# Patient Record
Sex: FEMALE | Race: WHITE | NOT HISPANIC OR LATINO | Employment: OTHER | URBAN - METROPOLITAN AREA
[De-identification: names, ages, dates, MRNs, and addresses within clinical notes are randomized per-mention and may not be internally consistent; named-entity substitution may affect disease eponyms.]

---

## 2023-08-18 ENCOUNTER — MEDICAL CORRESPONDENCE (OUTPATIENT)
Dept: HEALTH INFORMATION MANAGEMENT | Facility: CLINIC | Age: 78
End: 2023-08-18
Payer: MEDICARE

## 2023-08-28 ENCOUNTER — TRANSCRIBE ORDERS (OUTPATIENT)
Dept: OTHER | Age: 78
End: 2023-08-28

## 2023-08-28 ENCOUNTER — PRE VISIT (OUTPATIENT)
Dept: ONCOLOGY | Facility: CLINIC | Age: 78
End: 2023-08-28
Payer: MEDICARE

## 2023-08-28 DIAGNOSIS — K44.9 HIATAL HERNIA: Primary | ICD-10-CM

## 2023-08-28 NOTE — TELEPHONE ENCOUNTER
Action    Action Taken 8/28/23  WT from NPS, pt advised admission @ [facility] 5/29/23. Pt advised all records @ [facility].   12:32 PM

## 2023-08-30 ENCOUNTER — PATIENT OUTREACH (OUTPATIENT)
Dept: SURGERY | Facility: CLINIC | Age: 78
End: 2023-08-30

## 2023-08-30 DIAGNOSIS — K44.9 HIATAL HERNIA: Primary | ICD-10-CM

## 2023-08-30 NOTE — PROGRESS NOTES
New Patient Thoracic Surgery Nurse Navigator Note     Referring provider: Dr Strong, Gen Surg    Referring Clinic/Organization: [facility] / [facility]      Referred to: Thoracic Surgery    Requested provider (if applicable): First available - did not specify     Referral Received: 08/28/23       Evaluation for : hiatal hernia     Clinical History (per Nurse review of records provided):        * 5/29/2023 EGD ([facility])  Impression:    - Z-line irregular, 28 cm from the                          incisors. Not biopsied 2/2 recent                          bleed                          - tortous esophagus                          - 10 cm hiatal hernia with a few                          small Michael erosions, and old                          bllod clot into hernia- cleared.                          this is most likely source of                          recent bleed. No deep ulcer seen                          - Hematin (altered                          blood/coffee-ground-like material)                          in the gastroesophageal junction,                          in the cardia and in the gastric                          fundus. Cleared                          - Normal examined duodenum.                          - Small inlet patch upper esophagus         * 7/17/2023 GI note ([facility])  Ms. Walter is 77 year old female with PMH Harden's Esophagus and history of recurrent UGIB. 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 She was recently hospitalized from 05/29/2023 -05/30/2023 with GI bleed after having hematemesis and melena and EGD found 10cm hiatal hernia with few small michael erosions. Referring to General Surgery to discuss hiatal hernia.        * 8/10/2023 Gen Surg Dr Strong (NO NOTE IS VISIBLE IN CARE EVERYWHERE) - referral to Thoracic Surgery is visible for paraesophageal hernia.      Clinical Assessment / Barriers to Care (Per Nurse):    Pt with hx of hiatal hernia;  will get pre consult CT chest w/o per protocol for Thoracic Surgery consult & schedule with next available Thoracic Surgeon for consult.          Records Location: [facility] Everywhere     Records Needed:     Outside images from     Additional testing needed prior to consult:     CT chest          Ricardo Deng, RN, BSN, OCN  Oncology New Patient Nurse Navigator   [facility]  [phone]

## 2023-09-08 NOTE — TELEPHONE ENCOUNTER
RECORDS STATUS - ALL OTHER DIAGNOSIS      Action September 8, 2023 11:10 AM    Action Taken Request is faxed to PN for images.      Imaging Received  September 11, 2023 11:36 AM    Action: Images from PN  received and resolved to PACS.     RECORDS RECEIVED FROM:    DATE RECEIVED:    NOTES STATUS DETAILS   OFFICE NOTE from referring provider External 8/28/23: Self Referred   OFFICE NOTE from other specialist -  8/10/23: Dr. Jeremi Strong  7/17/23: Dr. Radha Ivey   DISCHARGE SUMMARY from hospital -  5/29/23: [facility]   OPERATIVE REPORT -  5/29/23: EGD    MEDICATION LIST -     LABS     ANYTHING RELATED TO DIAGNOSIS CE-     IMAGING (NEED IMAGES & REPORT)     CT SCANS PACS/ (img req from PN) 9/12/23: CT Chest    [facility]:  10/21/18: CT Chest Abd Pel  10/21/18: CT Head   XRAYS (img req from PN) 3/2/16: XR Chest   ULTRASOUND (img req from PN) 11/26/16: US Abd

## 2023-09-12 ENCOUNTER — ANCILLARY PROCEDURE (OUTPATIENT)
Dept: CT IMAGING | Facility: CLINIC | Age: 78
End: 2023-09-12
Attending: CLINICAL NURSE SPECIALIST
Payer: MEDICARE

## 2023-09-12 DIAGNOSIS — K44.9 HIATAL HERNIA: ICD-10-CM

## 2023-09-12 PROCEDURE — G1010 CDSM STANSON: HCPCS

## 2023-09-12 PROCEDURE — 71250 CT THORAX DX C-: CPT | Mod: MG

## 2023-09-18 ENCOUNTER — ONCOLOGY VISIT (OUTPATIENT)
Dept: ONCOLOGY | Facility: CLINIC | Age: 78
End: 2023-09-18
Attending: THORACIC SURGERY (CARDIOTHORACIC VASCULAR SURGERY)
Payer: MEDICARE

## 2023-09-18 ENCOUNTER — PREP FOR PROCEDURE (OUTPATIENT)
Dept: SURGERY | Facility: CLINIC | Age: 78
End: 2023-09-18

## 2023-09-18 ENCOUNTER — PRE VISIT (OUTPATIENT)
Dept: ONCOLOGY | Facility: CLINIC | Age: 78
End: 2023-09-18
Payer: MEDICARE

## 2023-09-18 VITALS
OXYGEN SATURATION: 98 % | HEART RATE: 79 BPM | DIASTOLIC BLOOD PRESSURE: 74 MMHG | RESPIRATION RATE: 16 BRPM | WEIGHT: 173.4 LBS | BODY MASS INDEX: 27.87 KG/M2 | SYSTOLIC BLOOD PRESSURE: 126 MMHG | HEIGHT: 66 IN

## 2023-09-18 DIAGNOSIS — K44.9 HIATAL HERNIA WITH GASTROESOPHAGEAL REFLUX: Primary | ICD-10-CM

## 2023-09-18 DIAGNOSIS — K21.9 HIATAL HERNIA WITH GASTROESOPHAGEAL REFLUX: Primary | ICD-10-CM

## 2023-09-18 PROCEDURE — G0463 HOSPITAL OUTPT CLINIC VISIT: HCPCS | Performed by: THORACIC SURGERY (CARDIOTHORACIC VASCULAR SURGERY)

## 2023-09-18 PROCEDURE — 99204 OFFICE O/P NEW MOD 45 MIN: CPT | Performed by: THORACIC SURGERY (CARDIOTHORACIC VASCULAR SURGERY)

## 2023-09-18 RX ORDER — VITAMIN B COMPLEX
1 TABLET ORAL EVERY MORNING
COMMUNITY

## 2023-09-18 RX ORDER — OMEPRAZOLE 40 MG/1
40 CAPSULE, DELAYED RELEASE ORAL 2 TIMES DAILY
COMMUNITY
Start: 2023-07-17

## 2023-09-18 RX ORDER — SUCRALFATE 1 G/1
1 TABLET ORAL 4 TIMES DAILY
COMMUNITY
Start: 2023-07-03

## 2023-09-18 RX ORDER — ESCITALOPRAM OXALATE 10 MG/1
10 TABLET ORAL EVERY MORNING
COMMUNITY
Start: 2023-06-29

## 2023-09-18 RX ORDER — ENOXAPARIN SODIUM 100 MG/ML
40 INJECTION SUBCUTANEOUS
Status: CANCELLED | OUTPATIENT
Start: 2023-09-18

## 2023-09-18 RX ORDER — SODIUM PHOSPHATE,MONO-DIBASIC 19G-7G/118
1 ENEMA (ML) RECTAL EVERY MORNING
COMMUNITY

## 2023-09-18 RX ORDER — ASCORBIC ACID 500 MG
1 TABLET ORAL EVERY MORNING
COMMUNITY
Start: 2023-07-17

## 2023-09-18 ASSESSMENT — PAIN SCALES - GENERAL: PAINLEVEL: NO PAIN (0)

## 2023-09-18 NOTE — LETTER
"    9/18/2023         RE: Talya Walter  [address] [address]        Dear Colleague,    Thank you for referring your patient, Talya Walter, to the [facility]. Please see a copy of my visit note below.    THORACIC SURGERY - NEW PATIENT OFFICE VISIT      Dear Dr. Goldberg,    I saw Talya Walter at Dr. Tan s request in consultation for the evaluation and treatment of a hiatal hernia.     HPI  Talya Walter is a 77 year old female with a known hiatal hernia and Harden's esophagus who recently had an upper GI bleed. Endoscopy showed Michael's ulcers and old clot in the stomach. Of note, she has von WIllebrand's disease. She has no dysphagia now - she avoids tomato skins which used to get stuck. She clears her throat a lot.  Her weight is stable. She has daily bowel movements.    She is an avid tobias and enjoys cooking. Walking is limited by low back pain/compression fractures of the vertebral bodies. She can walk at least a couple of blocks. She is up and down steps at her house daily.    Previsit Tests   CT chest 9/12/2023:  Large hiatal hernia, stable to 2018    EGD 5/29/2023: 10 cm hiatal hernia with small Michael erosions and old blood clot. 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  1 pack per day. Quit 38 years ago.     Physical examination  /74   Pulse 79   Resp 16   Ht 1.676 m (5' 6\")   Wt 78.7 kg (173 lb 6.4 oz)   SpO2 98%   " "BMI 27.99 kg/m     Physical Exam  Constitutional:       Appearance: Normal appearance.   Eyes:      Conjunctiva/sclera: Conjunctivae normal.   Cardiovascular:      Rate and Rhythm: Normal rate.   Pulmonary:      Effort: Pulmonary effort is normal.   Abdominal:      Palpations: Abdomen is soft.      Comments: Midline scar   Neurological:      Mental Status: She is oriented to person, place, and time.   Psychiatric:         Mood and Affect: Mood normal.         Behavior: Behavior normal.         Thought Content: Thought content normal.         Judgment: Judgment normal.            From a personal perspective, she lives with her partner. She has one child and five grandchildren and 1 greatgrandchild. 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    IMPRESSION (K44.9,  K21.9) Hiatal hernia with gastroesophageal reflux  (primary encounter diagnosis)     This person is a 77 year old female with a symptomatic hiatal hernia and reflux. She is a reasonable surgical candidate.      PLAN  I spent 40 min on the date of the encounter in chart review, patient visit, review of tests, documentation and/or discussion with other providers about the issues documented above. I reviewed the plan as follows:    Procedure planned: Robot-assisted laparoscopic hiatal hernia repair, fundoplication, possible gastroplasty, gastrostomy.    The risks include, but are not limited to the following.  There is a risk of injury to the stomach, esophagus or abdominal contents.  There is a risk of injury to the vagus nerves, which could result in functional emptying issues for the stomach.  There is a risk of problems with the wrap, which can include difficulty swallowing, persistent reflux and pain.  Coughing or retching/vomiting in the weeks after surgery can result in breakdown of the repair.  If the esophagus does not reach the abdominal cavity, a \"new\" esophagus will be made out of the top of the stomach.  This gastroplasty " "can dilate over time, causing new swallowing difficulties, and continues to produce acid.  The wrap may loosen over time and need to be revised with a new operation.  The lung cavities may be entered and tubes placed to evacuate air and fluid.   Finally, there is a risk of death.       Necessary Preop Tests & Appointments: Preoperative assessment clinic; Hematology clinic for perioperative plan (Scheduled this week)    Regional Anesthesia Plan: None    Anticoagulation Plan: Prophylactic Lovenox      I appreciate the opportunity to participate in the care of your patient and will keep you updated.      Sincerely,    Jarad Cheung MD      Oncology Rooming Note    September 18, 2023 1:18 PM   Talya aWlter is a 77 year old female who presents for:    Chief Complaint   Patient presents with     Oncology Clinic Visit     New Patient     Initial Vitals: /74   Pulse 79   Resp 16   Ht 1.676 m (5' 6\")   Wt 78.7 kg (173 lb 6.4 oz)   SpO2 98%   BMI 27.99 kg/m   Estimated body mass index is 27.99 kg/m  as calculated from the following:    Height as of this encounter: 1.676 m (5' 6\").    Weight as of this encounter: 78.7 kg (173 lb 6.4 oz). Body surface area is 1.91 meters squared.  No Pain (0) Comment: Data Unavailable   No LMP recorded.  Allergies reviewed: Yes  Medications reviewed: Yes    Medications: Medication refills not needed today.  Pharmacy name entered into [employer]: [id number] - [facility] [address]    Clinical concerns: None       Meryl Jara MA                Again, thank you for allowing me to participate in the care of your patient.        Sincerely,        Jarad Cheung MD  "

## 2023-09-18 NOTE — PROGRESS NOTES
"THORACIC SURGERY - NEW PATIENT OFFICE VISIT      Dear Dr. Goldberg,    I saw Talya Walter at Dr. Tan s request in consultation for the evaluation and treatment of a hiatal hernia.     HPI  Talya Walter is a 77 year old female with a known hiatal hernia and Harden's esophagus who recently had an upper GI bleed. Endoscopy showed Michael's ulcers and old clot in the stomach. Of note, she has von WIllebrand's disease. She has no dysphagia now - she avoids tomato skins which used to get stuck. She clears her throat a lot.  Her weight is stable. She has daily bowel movements.    She is an avid tobias and enjoys cooking. Walking is limited by low back pain/compression fractures of the vertebral bodies. She can walk at least a couple of blocks. She is up and down steps at her house daily.    Previsit Tests   CT chest 9/12/2023:  Large hiatal hernia, stable to 2018    EGD 5/29/2023: 10 cm hiatal hernia with small Michael erosions and old blood clot. 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  1 pack per day. Quit 38 years ago.     Physical examination  /74   Pulse 79   Resp 16   Ht 1.676 m (5' 6\")   Wt 78.7 kg (173 lb 6.4 oz)   SpO2 98%   BMI 27.99 kg/m     Physical Exam  Constitutional:       Appearance: Normal appearance.   Eyes:      Conjunctiva/sclera: Conjunctivae normal.   Cardiovascular:      Rate and Rhythm: Normal rate.   Pulmonary:      Effort: Pulmonary effort is normal. " "  Abdominal:      Palpations: Abdomen is soft.      Comments: Midline scar   Neurological:      Mental Status: She is oriented to person, place, and time.   Psychiatric:         Mood and Affect: Mood normal.         Behavior: Behavior normal.         Thought Content: Thought content normal.         Judgment: Judgment normal.            From a personal perspective, she lives with her partner. She has one child and five grandchildren and 1 greatgrandchild. 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    IMPRESSION (K44.9,  K21.9) Hiatal hernia with gastroesophageal reflux  (primary encounter diagnosis)     This person is a 77 year old female with a symptomatic hiatal hernia and reflux. She is a reasonable surgical candidate.      PLAN  I spent 40 min on the date of the encounter in chart review, patient visit, review of tests, documentation and/or discussion with other providers about the issues documented above. I reviewed the plan as follows:    Procedure planned: Robot-assisted laparoscopic hiatal hernia repair, fundoplication, possible gastroplasty, gastrostomy.    The risks include, but are not limited to the following.  There is a risk of injury to the stomach, esophagus or abdominal contents.  There is a risk of injury to the vagus nerves, which could result in functional emptying issues for the stomach.  There is a risk of problems with the wrap, which can include difficulty swallowing, persistent reflux and pain.  Coughing or retching/vomiting in the weeks after surgery can result in breakdown of the repair.  If the esophagus does not reach the abdominal cavity, a \"new\" esophagus will be made out of the top of the stomach.  This gastroplasty can dilate over time, causing new swallowing difficulties, and continues to produce acid.  The wrap may loosen over time and need to be revised with a new operation.  The lung cavities may be entered and tubes placed to evacuate air and fluid.   " Finally, there is a risk of death.       Necessary Preop Tests & Appointments: Preoperative assessment clinic; Hematology clinic for perioperative plan (Scheduled this week)    Regional Anesthesia Plan: None    Anticoagulation Plan: Prophylactic Lovenox      I appreciate the opportunity to participate in the care of your patient and will keep you updated.      Sincerely,    Jarad Cheung MD

## 2023-09-18 NOTE — PROGRESS NOTES
"Oncology Rooming Note    September 18, 2023 1:18 PM   Talya Walter is a 77 year old female who presents for:    Chief Complaint   Patient presents with    Oncology Clinic Visit     New Patient     Initial Vitals: /74   Pulse 79   Resp 16   Ht 1.676 m (5' 6\")   Wt 78.7 kg (173 lb 6.4 oz)   SpO2 98%   BMI 27.99 kg/m   Estimated body mass index is 27.99 kg/m  as calculated from the following:    Height as of this encounter: 1.676 m (5' 6\").    Weight as of this encounter: 78.7 kg (173 lb 6.4 oz). Body surface area is 1.91 meters squared.  No Pain (0) Comment: Data Unavailable   No LMP recorded.  Allergies reviewed: Yes  Medications reviewed: Yes    Medications: Medication refills not needed today.  Pharmacy name entered into [employer]: [id number] - [place], [place] - [address]    Clinical concerns: None       Meryl Jara MA              "

## 2023-09-21 ENCOUNTER — VIRTUAL VISIT (OUTPATIENT)
Dept: HEMATOLOGY | Facility: CLINIC | Age: 78
End: 2023-09-21
Attending: INTERNAL MEDICINE
Payer: MEDICARE

## 2023-09-21 DIAGNOSIS — D68.00 VON WILLEBRAND'S DISEASE (H): Primary | ICD-10-CM

## 2023-09-21 PROCEDURE — 99205 OFFICE O/P NEW HI 60 MIN: CPT | Mod: VID | Performed by: INTERNAL MEDICINE

## 2023-09-21 NOTE — PROGRESS NOTES
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  In order to make this safe, we will plan for VWF concentrate therapy prior to the procedure and a goal to keep VWF activity > 80% for about a week after the procedure    Recommendations:  I counseled the patient about my recommendations to receive VWF concentrate before the procedure and possibly 1-2 more doses after the procedure to prevent surgical bleeding  We will check with her insurance regarding coverage of VonVendi (rVWF) 50 U/kg to be given the day prior to the procedure  Repeat her VWF diagnostic panel to differentiate 2A from 2M (doesn't change surgical management plan though), with her next lab appointment next week.  Check VWF Ag/Activity starting 60 minutes after the procedure and daily thereafter.  Likely will have to repeat dosing once or twice after the procedure  She will contact us when she has a surgical date and we will work on insurance approval    60 minutes spent by me on the date of the encounter doing chart review, review of outside records, review of test results, interpretation of tests, patient visit, and documentation     Cesar Prasad MD   of Medicine, Division of Hematology, Oncology and Transplantation  [employer] [facility] [employer]     Lab addendum:  VWD multimer analysis (home lab did not do entire panel) consistent  "with type 2A VWD.       Latest Reference Range & Units 10/15/23 11:26   Factor 8 Assay 55 - 200 % 60   von Willebrand Factor Multimers  von Willebrand factor multimeric analysis shows absence of the   high-molecular-weight multimers.    von Willebrand Factor Activity 50 - 180 % 20 (L)   von Willebrand Factor Antigen 50 - 200 % 66   (L): Data is abnormally low    Cesar Prasad MD   of Medicine, Division of Hematology, Oncology and Transplantation  [employer] [facility] [employer]     -------------------------------  History: Talya Walter is a 77 year old woman with nearly lifelong diagnosis of severe von Willebrand disease with bleeding since childhood.  She reports that concern was raised for a bleeding disorder when she started losing her \"baby teeth\".  She has significant bleeding with loss of these deciduous teeth which eventually led to an evaluation for von Willebrand disease where she was living at the time, in [facility] [place]  She was eventually diagnosed with von Willebrand disease, but its unclear what subtype.    She reports that she did not have epistaxis or other significant bleeding symptoms in childhood.  Her recollection of the next bleeding event was with menarche, around age 13.  She reports severe prolonged menstrual periods and eventually was put on oral contraceptives to control this at a very young age.  She also recalls that by age 18 when she was attending [employer], she passed out from severe iron deficiency anemia related to heavy menstrual periods even despite use of oral contraceptives.    The next major of bleeding events occurred with childbirth.  Her recollection was that she had major bleeding with the birth of her children.  This occurred in the late 60s and early 70s where there is limited access to factor concentrate.  She then recalls that at age 32 (1977) it was recommended that she have a hysterectomy.  However, given fears about " the risk of bleeding, instead she received radiation to her ovaries which very rapidly induced a menopausal state.    The next major event in her life came in 2004, when she had a major abdominal exploratory surgery for ongoing problems with GI bleeding and iron deficiency anemia.  This was also complicated by bleeding and required transfusion.    Then around 2010, per her recollection, she started working with a local hematologist who recommended weekly prophylaxis with humate to try to get her GI bleeding to stop.  She had done well with this treatment but still did intermittently require IV iron.  Discontinued for at least 3 years.  More recently, around 2020, when she was hospitalized for an acute bleed which was found to be related to a large hiatal hernia and bleeding Michael ulcers.  It is recommended she increase her PPI to twice a day and eat smaller meals.  She was given blood transfusion and Humate-P.     She presents today to establish care so that she can safely move ahead with surgical repair of her hiatal hernia.  Unfortunately, in May 2023 despite going several years without bleeding and without humate for prophylaxis, she developed massive GI bleed and was hospitalized.  Hence, now the plan is to move ahead with surgical repair of the hiatal hernia to prevent other GI bleeding events.    Today, she reports she is feeling well with no complaints.  Her recollection is that she has type IIb von Willebrand disease but she is not exactly sure what the diagnosis is.  She has not scheduled a surgery yet but is planning to do so soon and would like to do so before the end of the year.    Medical history is notable for von Willebrand disease (unknown subtype but likely type IIa or type II), hiatal hernia with frequent GI bleeding, history of severe and recurrent iron deficiency anemia from GI bleeding.      Current Outpatient Medications   Medication    escitalopram (LEXAPRO) 10 MG tablet    Ferrous Sulfate  (IRON) 28 MG TABS    glucosamine-chondroitin 500-400 MG CAPS per capsule    omeprazole (PRILOSEC) 40 MG DR capsule    sucralfate (CARAFATE) 1 GM tablet    UNABLE TO FIND    vitamin C (ASCORBIC ACID) 500 MG tablet    Vitamin D3 (CHOLECALCIFEROL) 25 mcg (1000 units) tablet     No current facility-administered medications for this visit.       Physical Exam:  By phone she is in good spirits and sounds well.  Her thinking is linear and and converses normally    Labs:  I reviewed her labs in care everywhere since she gets her care through another healthcare system.  She does have several very old von Willebrand panels that show ristocetin cofactor activity less than 10% suggestive of severe von Willebrand factor activity deficiency.  She has mildly low von Willebrand factor antigen in the 50s to 70s which is consistent with a type II von Willebrand disease.  She does not have thrombocytopenia which argues against type IIb von Willebrand disease, but would be suggestive of type IIa or type IIM.    Imaging:  None

## 2023-09-21 NOTE — PROGRESS NOTES
Patient was contacted to complete the pre-visit call prior to their video visit with the provider.      Allergies and medications were reviewed.    I thanked them for their time to cover this information     Kaiden Armstrong CMA

## 2023-09-27 ENCOUNTER — TRANSFERRED RECORDS (OUTPATIENT)
Dept: HEALTH INFORMATION MANAGEMENT | Facility: CLINIC | Age: 78
End: 2023-09-27
Payer: MEDICARE

## 2023-10-02 ENCOUNTER — PATIENT OUTREACH (OUTPATIENT)
Dept: ONCOLOGY | Facility: CLINIC | Age: 78
End: 2023-10-02
Payer: MEDICARE

## 2023-10-02 ENCOUNTER — TELEPHONE (OUTPATIENT)
Dept: ONCOLOGY | Facility: CLINIC | Age: 78
End: 2023-10-02
Payer: MEDICARE

## 2023-10-02 NOTE — TELEPHONE ENCOUNTER
Spoke with patient to schedule procedure with Dr. Cheung   Procedure was scheduled on 10/18 at [facility] OR  Patient will have H&P with PAC video    Patient is aware a COVID-19 test is needed before their procedure ONLY IF symptomatic.   (Patient is aware Thoracic is no longer requiring COVID-19 test)       Patient is aware a / is needed day of surgery.   Surgery Letter was sent via [employer],     Pt states she has to have an infusion the day before surgery. She is wanting to know if she can be admitted the day before surgery to start her infusion since it has to be done in the late afternoon, and then she has to be back for surgery at 5:30 am. Writer informed pt a  msg would be sent to the nurse to give her a call to discuss.    Patient has my direct contact information for any further questions.

## 2023-10-02 NOTE — PROGRESS NOTES
[facility]: Cancer Care Initial Note                                    Discussion with Patient:                                                      Intro to RNCC role     Education concerns: Pre and post op surgery        Assessment:                                                      Initial  Current living arrangement:: I live in a private home (significant other)  Informal Support system:: Children;Significant other  Bed or wheelchair confined:: No  Mobility Status: Independent  Transportation means:: Family  Medication adherence problem (GOAL):: No  Knowledgeable about how to use meds:: Yes  Medication side effects suspected:: No  Advanced Care Plans/Directives on file:: No  Patient Reported Pain?: No    Plan of Care Education Review:   Assessment completed with:: Patient    Current living arrangement  I live in a private home (significant other)    Plan of Care Education   Yearly learning assessment completed?: Yes (see Education tab)  Diagnosis:: hiatal hernia  Does patient understand diagnosis?: Yes  Tx plan/regimen:: Robot-assisted hiatal hernia repair, Nissen fundoplication, possible gastroplasty, gastrostomy  Does patient understand treatment plan/regimen?: Yes  Preparing for treatment:: Reviewed treatment preparation information with patient (vascular access, day of chemo, visitor policy, what to bring, etc.)  Supportive services:: [facility] [place]  Transportation means:: Family  Safety/self care at home reviewed with patient:: Yes  Informal Support system:: Children;Significant other  Coping - concerns/fears reviewed with patient:: Yes  Plan of Care:: MD follow-up appointment  When to call provider:: Bleeding;Uncontrolled nausea/vomiting;Increased shortness of breath;New/worsening pain;Shaking chills;Temperature >100.4F;Uncontrolled diarrhea/constipation  Reasons for deferring treatment reviewed with patient:: Yes    Evaluation of Learning  Patient Education Provided: Yes  Readiness::  Acceptance  Method:: Explanation  Response:: Demonstrates understanding           Intervention/Education provided during outreach:                                                       Information about surgery  Worried about going to the [facility] in 24 hours for the Vonvendi infusion (needed 6 hours prior surgery; scheduled for 10/18/23)      Follow up call in 1-2 weeks  Patient to follow up as scheduled at next appt  Patient to call/Rentelligencet message with updates  Confirmed patient has clinic and triage numbers    Arabella Stuart, RN, BSN  Specialty Care Coordinator  [facility]  [phone]

## 2023-10-02 NOTE — TELEPHONE ENCOUNTER
FUTURE VISIT INFORMATION      SURGERY INFORMATION:  Date: 10/18/23  Location: uu or  Surgeon:  Jarad Cheung MD   Anesthesia Type:  general  Procedure: Robot-assisted hiatal hernia repair, Nissen fundoplication, possible gastroplasty, gastrostomy   Consult: ov 23    RECORDS REQUESTED FROM:       Primary Care Provider: Bear Goldberg [facility]    Most recent EKG+ Tracin23- [facility]

## 2023-10-03 ENCOUNTER — TRANSFERRED RECORDS (OUTPATIENT)
Dept: HEALTH INFORMATION MANAGEMENT | Facility: CLINIC | Age: 78
End: 2023-10-03
Payer: MEDICARE

## 2023-10-03 ENCOUNTER — PATIENT OUTREACH (OUTPATIENT)
Dept: ONCOLOGY | Facility: CLINIC | Age: 78
End: 2023-10-03
Payer: MEDICARE

## 2023-10-03 DIAGNOSIS — D68.00 VON WILLEBRAND'S DISEASE (H): Primary | ICD-10-CM

## 2023-10-03 RX ORDER — MEPERIDINE HYDROCHLORIDE 25 MG/ML
25 INJECTION INTRAMUSCULAR; INTRAVENOUS; SUBCUTANEOUS EVERY 30 MIN PRN
Status: CANCELLED | OUTPATIENT
Start: 2023-10-17

## 2023-10-03 RX ORDER — EPINEPHRINE 1 MG/ML
0.3 INJECTION, SOLUTION, CONCENTRATE INTRAVENOUS EVERY 5 MIN PRN
Status: CANCELLED | OUTPATIENT
Start: 2023-10-17

## 2023-10-03 RX ORDER — DIPHENHYDRAMINE HYDROCHLORIDE 50 MG/ML
50 INJECTION INTRAMUSCULAR; INTRAVENOUS
Status: CANCELLED
Start: 2023-10-17

## 2023-10-03 RX ORDER — HEPARIN SODIUM (PORCINE) LOCK FLUSH IV SOLN 100 UNIT/ML 100 UNIT/ML
5 SOLUTION INTRAVENOUS
Status: CANCELLED | OUTPATIENT
Start: 2023-10-17

## 2023-10-03 RX ORDER — HEPARIN SODIUM,PORCINE 10 UNIT/ML
5-20 VIAL (ML) INTRAVENOUS DAILY PRN
Status: CANCELLED | OUTPATIENT
Start: 2023-10-17

## 2023-10-03 RX ORDER — ALBUTEROL SULFATE 90 UG/1
1-2 AEROSOL, METERED RESPIRATORY (INHALATION)
Status: CANCELLED
Start: 2023-10-17

## 2023-10-03 RX ORDER — ALBUTEROL SULFATE 0.83 MG/ML
2.5 SOLUTION RESPIRATORY (INHALATION)
Status: CANCELLED | OUTPATIENT
Start: 2023-10-17

## 2023-10-03 NOTE — TELEPHONE ENCOUNTER
[facility]: Cancer Care                                                                                          LVM with Dr. Prasad's team regarding the order for medication Vonvendi which will be needed prior to her surgery scheduled for 10/18  In order to give it at [place] [facility], we will need it in a therapy plan.   Awaiting a call back       Arabella Stuart, RN, BSN  Specialty Care Coordinator  [facility] [place] [facility]  [phone]

## 2023-10-04 ENCOUNTER — ANESTHESIA EVENT (OUTPATIENT)
Dept: SURGERY | Facility: CLINIC | Age: 78
DRG: 327 | End: 2023-10-04
Payer: MEDICARE

## 2023-10-04 ENCOUNTER — TELEPHONE (OUTPATIENT)
Dept: SURGERY | Facility: CLINIC | Age: 78
End: 2023-10-04

## 2023-10-04 ENCOUNTER — PRE VISIT (OUTPATIENT)
Dept: SURGERY | Facility: CLINIC | Age: 78
End: 2023-10-04

## 2023-10-04 ENCOUNTER — VIRTUAL VISIT (OUTPATIENT)
Dept: SURGERY | Facility: CLINIC | Age: 78
End: 2023-10-04
Payer: MEDICARE

## 2023-10-04 DIAGNOSIS — Z01.818 PRE-OP EVALUATION: Primary | ICD-10-CM

## 2023-10-04 PROCEDURE — 99203 OFFICE O/P NEW LOW 30 MIN: CPT | Mod: 95 | Performed by: PHYSICIAN ASSISTANT

## 2023-10-04 ASSESSMENT — ENCOUNTER SYMPTOMS: SEIZURES: 0

## 2023-10-04 ASSESSMENT — LIFESTYLE VARIABLES: TOBACCO_USE: 1

## 2023-10-04 ASSESSMENT — COPD QUESTIONNAIRES: COPD: 0

## 2023-10-04 NOTE — TELEPHONE ENCOUNTER
[facility] Call [facility]    Phone Message    May a detailed message be left on voicemail: yes     Reason for Call: Other: Pt has questions regarding  von willebrand factor recomb (VONVENDI) injection 4,000 Units4,000 Units prior to surgery, please review and call pt      Action Taken: Message routed to:  [facility] ([facility]): pac    Travel Screening: Not Applicable

## 2023-10-04 NOTE — PATIENT INSTRUCTIONS
Preparing for Your Surgery      Name:  Talya Walter   MRN:  [id number]   :  1945   Today's Date:  10/4/2023       Arriving for surgery:  Surgery date:  10/18/2023  Arrival time:  5:30 am    Please come to:     Please come to:      [facility] [address]  [address] [place]  [address]      The [facility] Patient /Visitor Ramp is located at [address]. Patients and visitors who self-park will receive the reduced hospital parking rate. If the Patient /Visitor Ramp is full, please follow the signs to the  parking located at the main hospital entrance.     parking is available ( 24 hours/ 7 days a week)    Discounted parking pass options are available for patients and visitors. They can be purchased at the gamigo desk at the main hospital entrance.    -    Stop at the security desk and they will direct surgery patients to the 3rd floor Surgery Waiting Room. [phone]     -  If you are in need of directions, wheelchair or escort please stop at the Information/security desk in the lobby.       What can I eat or drink?  -  You may eat and drink normally up to 8 hours prior to arrival time. (Until 10/17/23 at 11 pm)  -  You may have clear liquids until 2 hours prior to arrival time. (Until 3:30 am)    Examples of clear liquids:  Water  Clear broth  Juices (apple, white grape, white cranberry  and cider) without pulp  Noncarbonated, powder based beverages  (lemonade and Geremias-Aid)  Sodas (Sprite, 7-Up, ginger ale and seltzer)  Coffee or tea (without milk or cream)  Gatorade    -  No Alcohol or cannabis products for at least 24 hours before surgery.     Which medicines can I take?    Hold Aspirin for 7 days before surgery.   Hold Multivitamins for 7 days before surgery.  Hold Supplements for 7 days before surgery. (Glucosamine)  Hold Ibuprofen (Advil, Motrin) for 1 day(s) before surgery--unless otherwise  directed by surgeon.  Hold Naproxen (Aleve) for 4 days before surgery.    -  DO NOT take these medications the day of surgery:  Ferrous Sulfate  Sucralfate (Carafate)  Vitamin C  Vitamin D      -  PLEASE TAKE these medications the day of surgery:  Escitalopram  Omeprazole       How do I prepare myself?  - Please take 2 showers (one the night prior to surgery and one the morning of surgery) using Scrubcare or Hibiclens soap.    Use this soap only from the neck to your toes.     Leave the soap on your skin for one minute--then rinse thoroughly.      You may use your own shampoo and conditioner. No other hair products.   - Please remove all jewelry and body piercings.  - No lotions, deodorants or fragrance.  - No makeup or fingernail polish.   - Bring your ID and insurance card.    -If you have a Deep Brain Stimulator, Spinal Cord Stimulator, or any Neuro Stimulator device---you must bring the remote control to the hospital.      Covid testing policy as of 12/06/2022  Your surgeon will notify and schedule you for a COVID test if one is needed before surgery--please direct any questions or COVID symptoms to your surgeon      Questions or Concerns:    - For any questions regarding the day of surgery or your hospital stay, please contact the Pre Admission Nursing Office at [phone].       - If you have health changes between today and your surgery, please call your surgeon.       - For questions after surgery, please call your surgeons office.           Current Visitor Guidelines    You may have 2 visitors in the pre op area.    Visiting hours: 8 a.m. to 8:30 p.m.    You may have four visitors during your inpatient hospital stay.    Patients confirmed or suspected to have symptoms of COVID 19 or flu:     No visitors allowed for adult patients.   Children (under age 18) can have 1 named visitor.     People who are sick or showing symptoms of COVID 19 or flu:    Are not allowed to visit patients--we can only make  exceptions in special situations.       Please follow these guidelines for your visit:          Please maintain social distance          Masking is optional--however at times you may be asked to wear a mask for the safety of yourself and others     Clean your hands with alcohol hand . Do this when you arrive at and leave the building and patient room,    And again after you touch your mask or anything in the room.     Go directly to and from the room you are visiting.     Stay in the patient s room during your visit. Limit going to other places in the hospital as much as possible     Leave bags and jackets at home or in the car.     For everyone s health, please don t come and go during your visit. That includes for smoking   during your visit.

## 2023-10-04 NOTE — TELEPHONE ENCOUNTER
Spoke with Talya regarding Vonvendi injection.  Clarified the order for her - she has 1 dose scheduled for the afternoon before surgery.  There is no other dose overdue.  Talya expressed understanding.  Irene Gibbons RN

## 2023-10-04 NOTE — PROGRESS NOTES
Talya is a 78 year old who is being evaluated via a billable video visit.      How would you like to obtain your AVS? Anastasia Florence   Talya is a 78 year old, presenting for the following health issues:  Pre-Op Exam    HPI           Review of Systems       Physical Exam   BRANDON Dobson LPN

## 2023-10-04 NOTE — H&P
Pre-Operative H & P     CC:  Preoperative exam to assess for increased cardiopulmonary risk while undergoing surgery and anesthesia.    Date of Encounter: 10/4/2023  Primary Care Physician:  Bear Goldberg     Reason for visit:   Encounter Diagnosis   Name Primary?    Pre-op evaluation Yes       HPI  Talya Walter is a 78 year old female who presents for pre-operative H & P in preparation for  Procedure Information       Case: [id number] Date/Time: 10/18/23 0730    Procedure: Robot-assisted hiatal hernia repair, Nissen fundoplication, possible gastroplasty, gastrostomy (Chest)    Anesthesia type: General    Diagnosis: Hiatal hernia with gastroesophageal reflux [K44.9, K21.9]    Pre-op diagnosis: Hiatal hernia with gastroesophageal reflux [K44.9, K21.9]    Location:  OR [address] [place] [address] OR    Providers: Jarad Cheung MD            Patient is being evaluated for comorbid conditions of von willebrand's disease, anemia    Ms. Walter has a known hiatal hernia and Harden's esophagus. She recently had a upper GI bleed. She was seen by Dr. Cheung and is now scheduled for the above procedure. Of note, she has Von Willebrand's disease and was seen by hematology for perioperative plan.     History is obtained from the patient and chart review    Hx of abnormal bleeding or anti-platelet use: Von Willebrand's disease    Menstrual history: No LMP recorded. Patient is perimenopausal.     Past Medical History  No past medical history on file.    Past Surgical History  Past Surgical History:   Procedure Laterality Date    LAPAROTOMY EXPLORATORY         Prior to Admission Medications  Current Outpatient Medications   Medication Sig Dispense Refill    escitalopram (LEXAPRO) 10 MG tablet Take 10 mg by mouth every morning      Ferrous Sulfate (IRON) 28 MG TABS Take 1 tablet by mouth every morning      glucosamine-chondroitin 500-400 MG CAPS per capsule Take 1 capsule by mouth every morning      omeprazole (PRILOSEC) 40 MG DR capsule  Take 40 mg by mouth 2 times daily      sucralfate (CARAFATE) 1 GM tablet Take 1 g by mouth 4 times daily      UNABLE TO FIND Take 1 capsule by mouth every morning MEDICATION NAME: Stool softener      vitamin C (ASCORBIC ACID) 500 MG tablet Take 1 tablet by mouth every morning      Vitamin D3 (CHOLECALCIFEROL) 25 mcg (1000 units) tablet Take 1 tablet by mouth every morning         Allergies  Allergies   Allergen Reactions    Desmopressin Anaphylaxis     PN: THROAT CLOSED    Aspirin      PN: POSSIBLE BLEED    Bee Venom Swelling    Meloxicam      Cause bleeding    Nsaids      PN: POSSIBLE BLEED       Social History  Social History     Socioeconomic History    Marital status: Patient Declined     Spouse name: Not on file    Number of children: Not on file    Years of education: Not on file    Highest education level: Not on file   Occupational History    Not on file   Tobacco Use    Smoking status: Former     Types: Cigarettes     Quit date:      Years since quittin.7     Passive exposure: Past    Smokeless tobacco: Never   Substance and Sexual Activity    Alcohol use: Yes     Comment: 1-2 glasses of wine daily    Drug use: Never    Sexual activity: Not on file   Other Topics Concern    Not on file   Social History Narrative    Not on file     Social Determinants of Health     Financial Resource Strain: Not on file   Food Insecurity: Not on file   Transportation Needs: Not on file   Physical Activity: Not on file   Stress: Not on file   Social Connections: Not on file   Interpersonal Safety: Not on file   Housing Stability: Not on file       Family History  Family History   Problem Relation Age of Onset    Anesthesia Reaction No family hx of        Review of Systems  The complete review of systems is negative other than noted in the HPI or here.   Anesthesia Evaluation   Pt has had prior anesthetic.     No history of anesthetic complications       ROS/MED HX  ENT/Pulmonary:     (+)                tobacco use,  Past use,                   (-) asthma and COPD   Neurologic:  - neg neurologic ROS  (-) no seizures and no CVA   Cardiovascular:     (+)  - -   -  - -                                 Previous cardiac testing   Echo: Date: Results:    Stress Test:  Date: Results:    ECG Reviewed:  Date: 5/2023 Results:  NSR  Cath:  Date: Results:   (-) taking anticoagulants/antiplatelets   METS/Exercise Tolerance: >4 METS Comment: , up and down stairs often    Hematologic: Comments: Von Willebrand's disease     (+)       history of blood transfusion, no previous transfusion reaction,     (-) history of blood clots   Musculoskeletal:  - neg musculoskeletal ROS     GI/Hepatic:     (+) GERD,     hiatal hernia,              Renal/Genitourinary:  - neg Renal ROS     Endo:  - neg endo ROS  (-) chronic steroid usage   Psychiatric/Substance Use:       Infectious Disease:  - neg infectious disease ROS     Malignancy:  - neg malignancy ROS     Other:            Virtual visit -  No vitals were obtained    Physical Exam  Constitutional: Awake, alert, cooperative, no apparent distress, and appears stated age.  HENT: Normocephalic  Respiratory: non labored breathing   Neurologic: Awake, alert, oriented to name, place and time.   Neuropsychiatric: Calm, cooperative. Normal affect.      Prior Labs/Diagnostic Studies   All labs and imaging personally reviewed     EKG/ stress test - if available please see in ROS above   No results found.       No data to display                  The patient's records and results personally reviewed by this provider.     Outside records reviewed from: Care Everywhere      Assessment    Talya Walter is a 78 year old female seen as a PAC referral for risk assessment and optimization for anesthesia.    Plan/Recommendations  Pt will be optimized for the proposed procedure.  See below for details on the assessment, risk, and preoperative recommendations    NEUROLOGY  - No history of TIA, CVA or seizure  -Post  "Op delirium risk factors:  No risk identified    ENT  - No current airway concerns.  Will need to be reassessed day of surgery.  Mallampati: Unable to assess  TM: Unable to assess    CARDIAC  - No history of CAD, Hypertension, and Afib  -denies cardiac history or symptoms   - METS (Metabolic Equivalents)  Patient performs 4 or more METS exercise without symptoms            Total Score: 0      RCRI-Low risk: Class 2 0.9% complication rate            Total Score: 1    RCRI: High Risk Surgery        PULMONARY  JOCE Low Risk            Total Score: 1    JOCE: Over 50 ys old      - Denies asthma or inhaler use  - Tobacco History    History   Smoking Status    Former    Types: Cigarettes    Quit date: 1985   Smokeless Tobacco    Never       GI  Hiatal hernia and GERD. Recent GI bleed. Now with the above procedure planned   PONV High Risk  Total Score: 4           1 AN PONV: Pt is Female    1 AN PONV: Patient is not a current smoker    1 AN PONV: Patient has history of PONV    1 AN PONV: Intended Post Op Opioids        /RENAL  - Baseline Creatinine WNL    ENDOCRINE    - BMI: Estimated body mass index is 27.99 kg/m  as calculated from the following:    Height as of 9/18/23: 1.676 m (5' 6\").    Weight as of 9/18/23: 78.7 kg (173 lb 6.4 oz).  Overweight (BMI 25.0-29.9)  - No history of Diabetes Mellitus    HEME  VTE Low Risk 0.26%            Total Score: 1    VTE: Greater than 59 yrs old      Von Willebrand's disease. Follows with oncology. She was seen 9/21 by Dr. Prasad with the following recommendations. Infusion is scheduled 10/17 at 3:00.     Recommendations:  I counseled the patient about my recommendations to receive VWF concentrate before the procedure and possibly 1-2 more doses after the procedure to prevent surgical bleeding  We will check with her insurance regarding coverage of VonVendi (rVWF) 50 U/kg to be given the day prior to the procedure  Repeat her VWF diagnostic panel to differentiate 2A from 2M (doesn't " change surgical management plan though), with her next lab appointment next week.  Check VWF Ag/Activity starting 60 minutes after the procedure and daily thereafter.  Likely will have to repeat dosing once or twice after the procedure  She will contact us when she has a surgical date and we will work on insurance approval    MSK  0/5 frailty screening. PM&R referral not indicated         Different anesthesia methods/types have been discussed with the patient, but they are aware that the final plan will be decided by the assigned anesthesia provider on the date of service.    The patient is optimized for their procedure. AVS with information on surgery time/arrival time, meds and NPO status given by nursing staff. No further diagnostic testing indicated.    Please refer to the physical examination documented by the anesthesiologist in the anesthesia record on the day of surgery.    Video-Visit Details    Type of service:  Video Visit    Provider received verbal consent for a Video Visit from the patient? Yes   Video Start Time:  0942  Video End Time: 0952    Originating Location (pt. Location): Home    Distant Location (provider location):  Off-site  Mode of Communication:  Video Conference via Meeting To You  On the day of service:     Prep time: 14 minutes  Visit time: 10 minutes  Documentation time: 4 minutes  ------------------------------------------  Total time: 28 minutes      Ashly Cho PA-C  Preoperative Assessment Center  [facility]  Clinic and Surgery Center  Phone: [phone]  Fax: [phone]

## 2023-10-06 DIAGNOSIS — Z01.818 PRE-OP EVALUATION: Primary | ICD-10-CM

## 2023-10-14 LAB
ABO/RH(D): NORMAL
ANTIBODY SCREEN: NEGATIVE
SPECIMEN EXPIRATION DATE: NORMAL

## 2023-10-15 ENCOUNTER — INFUSION THERAPY VISIT (OUTPATIENT)
Dept: INFUSION THERAPY | Facility: CLINIC | Age: 78
DRG: 327 | End: 2023-10-15
Attending: THORACIC SURGERY (CARDIOTHORACIC VASCULAR SURGERY)
Payer: MEDICARE

## 2023-10-15 DIAGNOSIS — Z01.818 PRE-OP EVALUATION: ICD-10-CM

## 2023-10-15 DIAGNOSIS — D68.00 VON WILLEBRAND'S DISEASE (H): ICD-10-CM

## 2023-10-15 PROCEDURE — 250N000011 HC RX IP 250 OP 636: Mod: JZ | Performed by: THORACIC SURGERY (CARDIOTHORACIC VASCULAR SURGERY)

## 2023-10-15 PROCEDURE — 86901 BLOOD TYPING SEROLOGIC RH(D): CPT | Performed by: THORACIC SURGERY (CARDIOTHORACIC VASCULAR SURGERY)

## 2023-10-15 PROCEDURE — 86850 RBC ANTIBODY SCREEN: CPT | Performed by: THORACIC SURGERY (CARDIOTHORACIC VASCULAR SURGERY)

## 2023-10-15 PROCEDURE — 85245 CLOT FACTOR VIII VW RISTOCTN: CPT

## 2023-10-15 PROCEDURE — 85245 CLOT FACTOR VIII VW RISTOCTN: CPT | Performed by: INTERNAL MEDICINE

## 2023-10-15 PROCEDURE — 36591 DRAW BLOOD OFF VENOUS DEVICE: CPT

## 2023-10-15 PROCEDURE — 85390 FIBRINOLYSINS SCREEN I&R: CPT | Mod: 26 | Performed by: PATHOLOGY

## 2023-10-15 PROCEDURE — 85246 CLOT FACTOR VIII VW ANTIGEN: CPT

## 2023-10-15 PROCEDURE — 36415 COLL VENOUS BLD VENIPUNCTURE: CPT

## 2023-10-15 PROCEDURE — 85247 CLOT FACTOR VIII MULTIMETRIC: CPT | Performed by: INTERNAL MEDICINE

## 2023-10-15 PROCEDURE — 85240 CLOT FACTOR VIII AHG 1 STAGE: CPT | Performed by: INTERNAL MEDICINE

## 2023-10-15 RX ORDER — HEPARIN SODIUM (PORCINE) LOCK FLUSH IV SOLN 100 UNIT/ML 100 UNIT/ML
5 SOLUTION INTRAVENOUS ONCE
Status: COMPLETED | OUTPATIENT
Start: 2023-10-15 | End: 2023-10-15

## 2023-10-15 RX ADMIN — Medication 5 ML: at 11:27

## 2023-10-15 NOTE — PROGRESS NOTES
Nursing Note:  Tayla Walter presents today for port labs.    Patient seen by provider today: No   present during visit today: Not Applicable.    Note: N/A.    Intravenous Access:  Implanted Port.    Discharge Plan:   Patient was discharged to home.    Irene Goldberg RN

## 2023-10-16 LAB
FACT VIII ACT/NOR PPP: 60 % (ref 55–200)
VWF AG ACT/NOR PPP IA: 66 % (ref 50–200)
VWF:AC ACT/NOR PPP IA: 20 % (ref 50–180)

## 2023-10-17 ENCOUNTER — INFUSION THERAPY VISIT (OUTPATIENT)
Dept: INFUSION THERAPY | Facility: CLINIC | Age: 78
DRG: 327 | End: 2023-10-17
Attending: THORACIC SURGERY (CARDIOTHORACIC VASCULAR SURGERY)
Payer: MEDICARE

## 2023-10-17 VITALS
TEMPERATURE: 97.5 F | DIASTOLIC BLOOD PRESSURE: 75 MMHG | OXYGEN SATURATION: 99 % | RESPIRATION RATE: 16 BRPM | SYSTOLIC BLOOD PRESSURE: 137 MMHG | HEART RATE: 78 BPM

## 2023-10-17 DIAGNOSIS — D68.00 VON WILLEBRAND'S DISEASE (H): Primary | ICD-10-CM

## 2023-10-17 LAB — VWF MULTIMERS PPP IB: NORMAL

## 2023-10-17 PROCEDURE — 250N000015 HC RX FACTOR IP MED 636 OP 636: Performed by: PHYSICIAN ASSISTANT

## 2023-10-17 PROCEDURE — 258N000003 HC RX IP 258 OP 636: Performed by: PHYSICIAN ASSISTANT

## 2023-10-17 PROCEDURE — 250N000011 HC RX IP 250 OP 636: Mod: JZ | Performed by: PHYSICIAN ASSISTANT

## 2023-10-17 PROCEDURE — 96374 THER/PROPH/DIAG INJ IV PUSH: CPT

## 2023-10-17 RX ORDER — EPINEPHRINE 1 MG/ML
0.3 INJECTION, SOLUTION INTRAMUSCULAR; SUBCUTANEOUS EVERY 5 MIN PRN
Status: CANCELLED | OUTPATIENT
Start: 2023-10-18

## 2023-10-17 RX ORDER — MEPERIDINE HYDROCHLORIDE 25 MG/ML
25 INJECTION INTRAMUSCULAR; INTRAVENOUS; SUBCUTANEOUS EVERY 30 MIN PRN
Status: CANCELLED | OUTPATIENT
Start: 2023-10-18

## 2023-10-17 RX ORDER — HEPARIN SODIUM (PORCINE) LOCK FLUSH IV SOLN 100 UNIT/ML 100 UNIT/ML
5 SOLUTION INTRAVENOUS
Status: CANCELLED | OUTPATIENT
Start: 2023-10-18

## 2023-10-17 RX ORDER — HEPARIN SODIUM (PORCINE) LOCK FLUSH IV SOLN 100 UNIT/ML 100 UNIT/ML
5 SOLUTION INTRAVENOUS
Status: DISCONTINUED | OUTPATIENT
Start: 2023-10-17 | End: 2023-10-17 | Stop reason: HOSPADM

## 2023-10-17 RX ORDER — DIPHENHYDRAMINE HYDROCHLORIDE 50 MG/ML
50 INJECTION INTRAMUSCULAR; INTRAVENOUS
Status: CANCELLED
Start: 2023-10-18

## 2023-10-17 RX ORDER — ALBUTEROL SULFATE 90 UG/1
1-2 AEROSOL, METERED RESPIRATORY (INHALATION)
Status: CANCELLED
Start: 2023-10-18

## 2023-10-17 RX ORDER — ALBUTEROL SULFATE 0.83 MG/ML
2.5 SOLUTION RESPIRATORY (INHALATION)
Status: CANCELLED | OUTPATIENT
Start: 2023-10-18

## 2023-10-17 RX ORDER — METHYLPREDNISOLONE SODIUM SUCCINATE 125 MG/2ML
125 INJECTION, POWDER, LYOPHILIZED, FOR SOLUTION INTRAMUSCULAR; INTRAVENOUS
Status: CANCELLED
Start: 2023-10-18

## 2023-10-17 RX ORDER — HEPARIN SODIUM,PORCINE 10 UNIT/ML
5-20 VIAL (ML) INTRAVENOUS DAILY PRN
Status: CANCELLED | OUTPATIENT
Start: 2023-10-18

## 2023-10-17 RX ADMIN — Medication 5 ML: at 15:52

## 2023-10-17 RX ADMIN — SODIUM CHLORIDE 250 ML: 9 INJECTION, SOLUTION INTRAVENOUS at 15:42

## 2023-10-17 RX ADMIN — VON WILLEBRAND FACTOR (RECOMBINANT) 3990 UNITS: KIT at 15:42

## 2023-10-17 ASSESSMENT — ENCOUNTER SYMPTOMS: SEIZURES: 0

## 2023-10-17 ASSESSMENT — PAIN SCALES - GENERAL: PAINLEVEL: NO PAIN (0)

## 2023-10-17 ASSESSMENT — LIFESTYLE VARIABLES: TOBACCO_USE: 1

## 2023-10-17 ASSESSMENT — COPD QUESTIONNAIRES: COPD: 0

## 2023-10-17 NOTE — ANESTHESIA PREPROCEDURE EVALUATION
Patient is being evaluated for comorbid conditions of von willebrand's disease, anemia    Ms. Walter has a known hiatal hernia and Harden's esophagus. She recently had a upper GI bleed. She was seen by Dr. Cheung and is now scheduled for the above procedure. Of note, she has Von Willebrand's disease and was seen by hematology for perioperative plan.           Social History    Review of Systems  The complete review of systems is negative other than noted in the HPI or here.   Anesthesia Evaluation   Pt has had prior anesthetic.     No history of anesthetic complications       ROS/MED HX  ENT/Pulmonary:     (+)                tobacco use, Past use,                   (-) asthma and COPD   Neurologic:  - neg neurologic ROS  (-) no seizures and no CVA   Cardiovascular:     (+)  - -   -  - -                                 Previous cardiac testing   Echo: Date: Results:    Stress Test:  Date: Results:    ECG Reviewed:  Date: 5/2023 Results:  NSR  Cath:  Date: Results:   (-) taking anticoagulants/antiplatelets   METS/Exercise Tolerance: >4 METS Comment: , up and down stairs often    Hematologic: Comments: Type 2 Von Willebrand's disease     (+)       history of blood transfusion, no previous transfusion reaction,     (-) history of blood clots   Musculoskeletal:  - neg musculoskeletal ROS     GI/Hepatic:     (+) GERD,     hiatal hernia (large),              Renal/Genitourinary:  - neg Renal ROS     Endo:  - neg endo ROS  (-) chronic steroid usage   Psychiatric/Substance Use:       Infectious Disease:  - neg infectious disease ROS     Malignancy:  - neg malignancy ROS     Other:            Von Willebrand's disease. Follows with oncology. She was seen 9/21 by Dr. Prasad with the following recommendations. Infusion is scheduled 10/17 at 3:00.     Recommendations:  I counseled the patient about my recommendations to receive VWF concentrate before the procedure and possibly 1-2 more doses after the procedure  to prevent surgical bleeding  We will check with her insurance regarding coverage of VonVendi (rVWF) 50 U/kg to be given the day prior to the procedure  Repeat her VWF diagnostic panel to differentiate 2A from 2M (doesn't change surgical management plan though), with her next lab appointment next week.  Check VWF Ag/Activity starting 60 minutes after the procedure and daily thereafter.  Likely will have to repeat dosing once or twice after the procedure  She will contact us when she has a surgical date and we will work on insurance approval    Juan Carlos Benson MD  Anesthesiology Resident, PGY-4   Physical Exam    Airway        Mallampati: II       Respiratory Devices and Support         Dental     Comment: Patient reports no loose or chipped teeth        Cardiovascular          Rhythm and rate: regular and normal     Pulmonary           breath sounds clear to auscultation           Anesthesia Plan    ASA Status:  3    NPO Status:  NPO Appropriate    Anesthesia Type: General.     - Airway: ETT   Induction: Intravenous, RSI.   Maintenance: Balanced.   Techniques and Equipment:     - Lines/Monitors: BIS, 2nd IV, Arterial Line, Port in situ     - Blood: T&S     Consents          - Extended Intubation/Ventilatory Support Discussed: No.      - Patient is DNR/DNI Status: No     Use of blood products discussed: Yes.     - Discussed with: Patient.     - Consented: consented to blood products            Reason for refusal: other.     Postoperative Care    Pain management: IV analgesics, Oral pain medications.   PONV prophylaxis: Ondansetron (or other 5HT-3), Dexamethasone or Solumedrol     Comments:    Other Comments: Received vWF infusion yesterday, to recheck level postop

## 2023-10-17 NOTE — PROGRESS NOTES
Infusion Nursing Note:  Talya Walter presents today for vonvendi.    Patient seen by provider today: No   present during visit today: Not Applicable.    Note: Patient has no concerns to report today. Has never received vonvendi before, states she understands indication. Receiving in preparation for surgery tomorrow.      Intravenous Access:  Implanted Port.    Treatment Conditions:  Not Applicable. Lab orders per therapy plan done 10/15.      Post Infusion Assessment:  Patient tolerated infusion without incident.  Blood return noted pre and post infusion.  Site patent and intact, free from redness, edema or discomfort.  No evidence of extravasations.  Access discontinued per protocol.       Discharge Plan:   Discharge instructions reviewed with: Patient.  Patient and/or family verbalized understanding of discharge instructions and all questions answered.  AVS to patient via [employer].  Patient will return as needed for next appointment.   Patient discharged in stable condition accompanied by: self.  Departure Mode: Ambulatory.      Nel Solomon RN

## 2023-10-18 ENCOUNTER — APPOINTMENT (OUTPATIENT)
Dept: GENERAL RADIOLOGY | Facility: CLINIC | Age: 78
DRG: 327 | End: 2023-10-18
Attending: THORACIC SURGERY (CARDIOTHORACIC VASCULAR SURGERY)
Payer: MEDICARE

## 2023-10-18 ENCOUNTER — ANESTHESIA (OUTPATIENT)
Dept: SURGERY | Facility: CLINIC | Age: 78
DRG: 327 | End: 2023-10-18
Payer: MEDICARE

## 2023-10-18 ENCOUNTER — HOSPITAL ENCOUNTER (INPATIENT)
Facility: CLINIC | Age: 78
LOS: 6 days | Discharge: HOME | DRG: 327 | End: 2023-10-24
Attending: THORACIC SURGERY (CARDIOTHORACIC VASCULAR SURGERY) | Admitting: THORACIC SURGERY (CARDIOTHORACIC VASCULAR SURGERY)
Payer: MEDICARE

## 2023-10-18 DIAGNOSIS — K44.9 HIATAL HERNIA: Primary | ICD-10-CM

## 2023-10-18 DIAGNOSIS — D68.00 VON WILLEBRAND'S DISEASE (H): ICD-10-CM

## 2023-10-18 LAB
ANION GAP SERPL CALCULATED.3IONS-SCNC: 11 MMOL/L (ref 7–15)
BUN SERPL-MCNC: 12.8 MG/DL (ref 8–23)
CALCIUM SERPL-MCNC: 9 MG/DL (ref 8.8–10.2)
CF REDUC 30M P MA P HEP LENFR BLD TEG: 0 % (ref 0–8)
CF REDUC 30M P MA P HEP LENFR BLD TEG: 0 % (ref 0–8)
CF REDUC 60M P MA P HEPASE LENFR BLD TEG: 0.2 % (ref 0–15)
CF REDUC 60M P MA P HEPASE LENFR BLD TEG: 0.4 % (ref 0–15)
CFT P HPASE BLD TEG: 0.9 MINUTE (ref 1–3)
CFT P HPASE BLD TEG: 1.1 MINUTE (ref 1–3)
CHLORIDE SERPL-SCNC: 105 MMOL/L (ref 98–107)
CI (COAGULATION INDEX)(Z): 2.7 (ref -3–3)
CI (COAGULATION INDEX)(Z): 3.6 (ref -3–3)
CLOT ANGLE P HPASE BLD TEG: 74.9 DEGREES (ref 53–72)
CLOT ANGLE P HPASE BLD TEG: 77.3 DEGREES (ref 53–72)
CLOT INIT P HPASE BLD TEG: 4.3 MINUTE (ref 5–10)
CLOT INIT P HPASE BLD TEG: 5.2 MINUTE (ref 5–10)
CLOT STRENGTH P HPASE BLD TEG: 11.9 KD/SC (ref 4.5–11)
CLOT STRENGTH P HPASE BLD TEG: 12.4 KD/SC (ref 4.5–11)
CREAT SERPL-MCNC: 0.64 MG/DL (ref 0.51–0.95)
DEPRECATED HCO3 PLAS-SCNC: 21 MMOL/L (ref 22–29)
EGFRCR SERPLBLD CKD-EPI 2021: 90 ML/MIN/1.73M2
ERYTHROCYTE [DISTWIDTH] IN BLOOD BY AUTOMATED COUNT: 15.6 % (ref 10–15)
GLUCOSE SERPL-MCNC: 168 MG/DL (ref 70–99)
HCT VFR BLD AUTO: 27.6 % (ref 35–47)
HGB BLD-MCNC: 11 G/DL (ref 11.7–15.7)
HGB BLD-MCNC: 9 G/DL (ref 11.7–15.7)
MAGNESIUM SERPL-MCNC: 1.5 MG/DL (ref 1.7–2.3)
MCF P HPASE BLD TEG: 70.4 MM (ref 50–70)
MCF P HPASE BLD TEG: 71.2 MM (ref 50–70)
MCH RBC QN AUTO: 36.1 PG (ref 26.5–33)
MCHC RBC AUTO-ENTMCNC: 32.6 G/DL (ref 31.5–36.5)
MCV RBC AUTO: 111 FL (ref 78–100)
PLATELET # BLD AUTO: 209 10E3/UL (ref 150–450)
POTASSIUM SERPL-SCNC: 4.6 MMOL/L (ref 3.4–5.3)
RBC # BLD AUTO: 2.49 10E6/UL (ref 3.8–5.2)
SODIUM SERPL-SCNC: 137 MMOL/L (ref 135–145)
WBC # BLD AUTO: 11.9 10E3/UL (ref 4–11)

## 2023-10-18 PROCEDURE — 83735 ASSAY OF MAGNESIUM: CPT | Performed by: STUDENT IN AN ORGANIZED HEALTH CARE EDUCATION/TRAINING PROGRAM

## 2023-10-18 PROCEDURE — 250N000011 HC RX IP 250 OP 636: Mod: JZ | Performed by: THORACIC SURGERY (CARDIOTHORACIC VASCULAR SURGERY)

## 2023-10-18 PROCEDURE — 258N000003 HC RX IP 258 OP 636: Performed by: STUDENT IN AN ORGANIZED HEALTH CARE EDUCATION/TRAINING PROGRAM

## 2023-10-18 PROCEDURE — 250N000025 HC SEVOFLURANE, PER MIN: Performed by: THORACIC SURGERY (CARDIOTHORACIC VASCULAR SURGERY)

## 2023-10-18 PROCEDURE — 250N000011 HC RX IP 250 OP 636: Performed by: STUDENT IN AN ORGANIZED HEALTH CARE EDUCATION/TRAINING PROGRAM

## 2023-10-18 PROCEDURE — 999N000065 XR CHEST PORT 1 VIEW

## 2023-10-18 PROCEDURE — 250N000009 HC RX 250: Performed by: THORACIC SURGERY (CARDIOTHORACIC VASCULAR SURGERY)

## 2023-10-18 PROCEDURE — 120N000002 HC R&B MED SURG/OB UMMC

## 2023-10-18 PROCEDURE — 370N000017 HC ANESTHESIA TECHNICAL FEE, PER MIN: Performed by: THORACIC SURGERY (CARDIOTHORACIC VASCULAR SURGERY)

## 2023-10-18 PROCEDURE — 272N000002 HC OR SUPPLY OTHER OPNP: Performed by: THORACIC SURGERY (CARDIOTHORACIC VASCULAR SURGERY)

## 2023-10-18 PROCEDURE — 80048 BASIC METABOLIC PNL TOTAL CA: CPT | Performed by: STUDENT IN AN ORGANIZED HEALTH CARE EDUCATION/TRAINING PROGRAM

## 2023-10-18 PROCEDURE — 0W9B40Z DRAINAGE OF LEFT PLEURAL CAVITY WITH DRAINAGE DEVICE, PERCUTANEOUS ENDOSCOPIC APPROACH: ICD-10-PCS | Performed by: THORACIC SURGERY (CARDIOTHORACIC VASCULAR SURGERY)

## 2023-10-18 PROCEDURE — 43246 EGD PLACE GASTROSTOMY TUBE: CPT | Mod: GC | Performed by: THORACIC SURGERY (CARDIOTHORACIC VASCULAR SURGERY)

## 2023-10-18 PROCEDURE — 360N000080 HC SURGERY LEVEL 7, PER MIN: Performed by: THORACIC SURGERY (CARDIOTHORACIC VASCULAR SURGERY)

## 2023-10-18 PROCEDURE — 0DNW4ZZ RELEASE PERITONEUM, PERCUTANEOUS ENDOSCOPIC APPROACH: ICD-10-PCS | Performed by: THORACIC SURGERY (CARDIOTHORACIC VASCULAR SURGERY)

## 2023-10-18 PROCEDURE — 250N000009 HC RX 250: Performed by: STUDENT IN AN ORGANIZED HEALTH CARE EDUCATION/TRAINING PROGRAM

## 2023-10-18 PROCEDURE — 250N000013 HC RX MED GY IP 250 OP 250 PS 637: Performed by: STUDENT IN AN ORGANIZED HEALTH CARE EDUCATION/TRAINING PROGRAM

## 2023-10-18 PROCEDURE — 272N000001 HC OR GENERAL SUPPLY STERILE: Performed by: THORACIC SURGERY (CARDIOTHORACIC VASCULAR SURGERY)

## 2023-10-18 PROCEDURE — 85396 CLOTTING ASSAY WHOLE BLOOD: CPT | Performed by: THORACIC SURGERY (CARDIOTHORACIC VASCULAR SURGERY)

## 2023-10-18 PROCEDURE — 250N000009 HC RX 250: Performed by: ANESTHESIOLOGY

## 2023-10-18 PROCEDURE — 0BQT4ZZ REPAIR DIAPHRAGM, PERCUTANEOUS ENDOSCOPIC APPROACH: ICD-10-PCS | Performed by: THORACIC SURGERY (CARDIOTHORACIC VASCULAR SURGERY)

## 2023-10-18 PROCEDURE — 36415 COLL VENOUS BLD VENIPUNCTURE: CPT | Performed by: STUDENT IN AN ORGANIZED HEALTH CARE EDUCATION/TRAINING PROGRAM

## 2023-10-18 PROCEDURE — 85027 COMPLETE CBC AUTOMATED: CPT | Performed by: STUDENT IN AN ORGANIZED HEALTH CARE EDUCATION/TRAINING PROGRAM

## 2023-10-18 PROCEDURE — 88302 TISSUE EXAM BY PATHOLOGIST: CPT | Mod: TC | Performed by: THORACIC SURGERY (CARDIOTHORACIC VASCULAR SURGERY)

## 2023-10-18 PROCEDURE — 85245 CLOT FACTOR VIII VW RISTOCTN: CPT | Performed by: STUDENT IN AN ORGANIZED HEALTH CARE EDUCATION/TRAINING PROGRAM

## 2023-10-18 PROCEDURE — 83605 ASSAY OF LACTIC ACID: CPT

## 2023-10-18 PROCEDURE — 43281 LAP PARAESOPHAG HERN REPAIR: CPT | Mod: GC | Performed by: THORACIC SURGERY (CARDIOTHORACIC VASCULAR SURGERY)

## 2023-10-18 PROCEDURE — 8E0W4CZ ROBOTIC ASSISTED PROCEDURE OF TRUNK REGION, PERCUTANEOUS ENDOSCOPIC APPROACH: ICD-10-PCS | Performed by: THORACIC SURGERY (CARDIOTHORACIC VASCULAR SURGERY)

## 2023-10-18 PROCEDURE — 250N000011 HC RX IP 250 OP 636: Performed by: THORACIC SURGERY (CARDIOTHORACIC VASCULAR SURGERY)

## 2023-10-18 PROCEDURE — 85246 CLOT FACTOR VIII VW ANTIGEN: CPT | Performed by: STUDENT IN AN ORGANIZED HEALTH CARE EDUCATION/TRAINING PROGRAM

## 2023-10-18 PROCEDURE — 999N000141 HC STATISTIC PRE-PROCEDURE NURSING ASSESSMENT: Performed by: THORACIC SURGERY (CARDIOTHORACIC VASCULAR SURGERY)

## 2023-10-18 PROCEDURE — 88302 TISSUE EXAM BY PATHOLOGIST: CPT | Mod: 26 | Performed by: PATHOLOGY

## 2023-10-18 PROCEDURE — 0WJP8ZZ INSPECTION OF GASTROINTESTINAL TRACT, VIA NATURAL OR ARTIFICIAL OPENING ENDOSCOPIC APPROACH: ICD-10-PCS | Performed by: THORACIC SURGERY (CARDIOTHORACIC VASCULAR SURGERY)

## 2023-10-18 PROCEDURE — 85240 CLOT FACTOR VIII AHG 1 STAGE: CPT | Performed by: PHYSICIAN ASSISTANT

## 2023-10-18 PROCEDURE — 36415 COLL VENOUS BLD VENIPUNCTURE: CPT | Performed by: PHYSICIAN ASSISTANT

## 2023-10-18 PROCEDURE — 82330 ASSAY OF CALCIUM: CPT

## 2023-10-18 PROCEDURE — 85018 HEMOGLOBIN: CPT | Performed by: STUDENT IN AN ORGANIZED HEALTH CARE EDUCATION/TRAINING PROGRAM

## 2023-10-18 PROCEDURE — 71045 X-RAY EXAM CHEST 1 VIEW: CPT | Mod: 26 | Performed by: RADIOLOGY

## 2023-10-18 PROCEDURE — 710N000010 HC RECOVERY PHASE 1, LEVEL 2, PER MIN: Performed by: THORACIC SURGERY (CARDIOTHORACIC VASCULAR SURGERY)

## 2023-10-18 RX ORDER — DEXAMETHASONE SODIUM PHOSPHATE 4 MG/ML
INJECTION, SOLUTION INTRA-ARTICULAR; INTRALESIONAL; INTRAMUSCULAR; INTRAVENOUS; SOFT TISSUE PRN
Status: DISCONTINUED | OUTPATIENT
Start: 2023-10-18 | End: 2023-10-18

## 2023-10-18 RX ORDER — HYDROMORPHONE HCL IN WATER/PF 6 MG/30 ML
0.2 PATIENT CONTROLLED ANALGESIA SYRINGE INTRAVENOUS EVERY 5 MIN PRN
Status: DISCONTINUED | OUTPATIENT
Start: 2023-10-18 | End: 2023-10-18 | Stop reason: HOSPADM

## 2023-10-18 RX ORDER — HYDROMORPHONE HCL IN WATER/PF 6 MG/30 ML
0.4 PATIENT CONTROLLED ANALGESIA SYRINGE INTRAVENOUS
Status: DISCONTINUED | OUTPATIENT
Start: 2023-10-18 | End: 2023-10-23

## 2023-10-18 RX ORDER — ENOXAPARIN SODIUM 100 MG/ML
40 INJECTION SUBCUTANEOUS EVERY 24 HOURS
Status: DISCONTINUED | OUTPATIENT
Start: 2023-10-19 | End: 2023-10-24 | Stop reason: HOSPADM

## 2023-10-18 RX ORDER — METHOCARBAMOL 500 MG/1
500 TABLET, FILM COATED ORAL EVERY 6 HOURS PRN
Status: DISCONTINUED | OUTPATIENT
Start: 2023-10-18 | End: 2023-10-24 | Stop reason: HOSPADM

## 2023-10-18 RX ORDER — CLOTRIMAZOLE 1 G/ML
SOLUTION TOPICAL
COMMUNITY
End: 2024-06-03

## 2023-10-18 RX ORDER — PROPOFOL 10 MG/ML
INJECTION, EMULSION INTRAVENOUS PRN
Status: DISCONTINUED | OUTPATIENT
Start: 2023-10-18 | End: 2023-10-18

## 2023-10-18 RX ORDER — OXYCODONE HYDROCHLORIDE 10 MG/1
10 TABLET ORAL EVERY 4 HOURS PRN
Status: DISCONTINUED | OUTPATIENT
Start: 2023-10-18 | End: 2023-10-24 | Stop reason: HOSPADM

## 2023-10-18 RX ORDER — HYDROMORPHONE HCL IN WATER/PF 6 MG/30 ML
0.4 PATIENT CONTROLLED ANALGESIA SYRINGE INTRAVENOUS EVERY 5 MIN PRN
Status: DISCONTINUED | OUTPATIENT
Start: 2023-10-18 | End: 2023-10-18 | Stop reason: HOSPADM

## 2023-10-18 RX ORDER — ACETAMINOPHEN 325 MG/1
650 TABLET ORAL EVERY 4 HOURS PRN
Status: DISCONTINUED | OUTPATIENT
Start: 2023-10-21 | End: 2023-10-24 | Stop reason: HOSPADM

## 2023-10-18 RX ORDER — NALOXONE HYDROCHLORIDE 0.4 MG/ML
0.2 INJECTION, SOLUTION INTRAMUSCULAR; INTRAVENOUS; SUBCUTANEOUS
Status: DISCONTINUED | OUTPATIENT
Start: 2023-10-18 | End: 2023-10-24 | Stop reason: HOSPADM

## 2023-10-18 RX ORDER — NALOXONE HYDROCHLORIDE 0.4 MG/ML
0.4 INJECTION, SOLUTION INTRAMUSCULAR; INTRAVENOUS; SUBCUTANEOUS
Status: DISCONTINUED | OUTPATIENT
Start: 2023-10-18 | End: 2023-10-24 | Stop reason: HOSPADM

## 2023-10-18 RX ORDER — FENTANYL CITRATE 50 UG/ML
25 INJECTION, SOLUTION INTRAMUSCULAR; INTRAVENOUS EVERY 5 MIN PRN
Status: DISCONTINUED | OUTPATIENT
Start: 2023-10-18 | End: 2023-10-18 | Stop reason: HOSPADM

## 2023-10-18 RX ORDER — CALCIUM CHLORIDE 100 MG/ML
INJECTION INTRAVENOUS; INTRAVENTRICULAR PRN
Status: DISCONTINUED | OUTPATIENT
Start: 2023-10-18 | End: 2023-10-18

## 2023-10-18 RX ORDER — LIDOCAINE HYDROCHLORIDE 20 MG/ML
INJECTION, SOLUTION INFILTRATION; PERINEURAL PRN
Status: DISCONTINUED | OUTPATIENT
Start: 2023-10-18 | End: 2023-10-18

## 2023-10-18 RX ORDER — ACETAMINOPHEN 325 MG/1
975 TABLET ORAL ONCE
Status: COMPLETED | OUTPATIENT
Start: 2023-10-18 | End: 2023-10-18

## 2023-10-18 RX ORDER — OXYCODONE HYDROCHLORIDE 5 MG/1
5 TABLET ORAL EVERY 4 HOURS PRN
Status: DISCONTINUED | OUTPATIENT
Start: 2023-10-18 | End: 2023-10-24 | Stop reason: HOSPADM

## 2023-10-18 RX ORDER — PROCHLORPERAZINE MALEATE 5 MG
5 TABLET ORAL EVERY 6 HOURS PRN
Status: DISCONTINUED | OUTPATIENT
Start: 2023-10-18 | End: 2023-10-24 | Stop reason: HOSPADM

## 2023-10-18 RX ORDER — FENTANYL CITRATE 50 UG/ML
50 INJECTION, SOLUTION INTRAMUSCULAR; INTRAVENOUS EVERY 5 MIN PRN
Status: DISCONTINUED | OUTPATIENT
Start: 2023-10-18 | End: 2023-10-18 | Stop reason: HOSPADM

## 2023-10-18 RX ORDER — ACETAMINOPHEN 325 MG/1
975 TABLET ORAL EVERY 8 HOURS
Status: COMPLETED | OUTPATIENT
Start: 2023-10-18 | End: 2023-10-21

## 2023-10-18 RX ORDER — ESCITALOPRAM OXALATE 10 MG/1
10 TABLET ORAL EVERY MORNING
Status: DISCONTINUED | OUTPATIENT
Start: 2023-10-19 | End: 2023-10-24 | Stop reason: HOSPADM

## 2023-10-18 RX ORDER — FENTANYL CITRATE 50 UG/ML
INJECTION, SOLUTION INTRAMUSCULAR; INTRAVENOUS PRN
Status: DISCONTINUED | OUTPATIENT
Start: 2023-10-18 | End: 2023-10-18

## 2023-10-18 RX ORDER — EPHEDRINE SULFATE 50 MG/ML
INJECTION, SOLUTION INTRAMUSCULAR; INTRAVENOUS; SUBCUTANEOUS PRN
Status: DISCONTINUED | OUTPATIENT
Start: 2023-10-18 | End: 2023-10-18

## 2023-10-18 RX ORDER — ACETAMINOPHEN 650 MG/1
650 SUPPOSITORY RECTAL EVERY 4 HOURS PRN
Status: ACTIVE | OUTPATIENT
Start: 2023-10-18 | End: 2023-10-21

## 2023-10-18 RX ORDER — ONDANSETRON 4 MG/1
4 TABLET, ORALLY DISINTEGRATING ORAL EVERY 6 HOURS PRN
Status: DISCONTINUED | OUTPATIENT
Start: 2023-10-18 | End: 2023-10-24 | Stop reason: HOSPADM

## 2023-10-18 RX ORDER — POLYETHYLENE GLYCOL 3350 17 G/17G
17 POWDER, FOR SOLUTION ORAL DAILY
Status: DISCONTINUED | OUTPATIENT
Start: 2023-10-19 | End: 2023-10-24 | Stop reason: HOSPADM

## 2023-10-18 RX ORDER — ONDANSETRON 2 MG/ML
4 INJECTION INTRAMUSCULAR; INTRAVENOUS EVERY 30 MIN PRN
Status: DISCONTINUED | OUTPATIENT
Start: 2023-10-18 | End: 2023-10-18 | Stop reason: HOSPADM

## 2023-10-18 RX ORDER — ONDANSETRON 2 MG/ML
INJECTION INTRAMUSCULAR; INTRAVENOUS PRN
Status: DISCONTINUED | OUTPATIENT
Start: 2023-10-18 | End: 2023-10-18

## 2023-10-18 RX ORDER — LIDOCAINE 40 MG/G
CREAM TOPICAL
Status: DISCONTINUED | OUTPATIENT
Start: 2023-10-18 | End: 2023-10-18 | Stop reason: HOSPADM

## 2023-10-18 RX ORDER — CEFAZOLIN SODIUM/WATER 2 G/20 ML
2 SYRINGE (ML) INTRAVENOUS SEE ADMIN INSTRUCTIONS
Status: DISCONTINUED | OUTPATIENT
Start: 2023-10-18 | End: 2023-10-18 | Stop reason: HOSPADM

## 2023-10-18 RX ORDER — DEXTROSE MONOHYDRATE, SODIUM CHLORIDE, AND POTASSIUM CHLORIDE 50; 1.49; 4.5 G/1000ML; G/1000ML; G/1000ML
INJECTION, SOLUTION INTRAVENOUS CONTINUOUS
Status: DISCONTINUED | OUTPATIENT
Start: 2023-10-18 | End: 2023-10-21

## 2023-10-18 RX ORDER — CLOTRIMAZOLE 1 G/ML
SOLUTION TOPICAL 3 TIMES DAILY
Status: DISCONTINUED | OUTPATIENT
Start: 2023-10-19 | End: 2023-10-24 | Stop reason: HOSPADM

## 2023-10-18 RX ORDER — SODIUM CHLORIDE, SODIUM LACTATE, POTASSIUM CHLORIDE, CALCIUM CHLORIDE 600; 310; 30; 20 MG/100ML; MG/100ML; MG/100ML; MG/100ML
INJECTION, SOLUTION INTRAVENOUS CONTINUOUS PRN
Status: DISCONTINUED | OUTPATIENT
Start: 2023-10-18 | End: 2023-10-18

## 2023-10-18 RX ORDER — SODIUM CHLORIDE, SODIUM LACTATE, POTASSIUM CHLORIDE, CALCIUM CHLORIDE 600; 310; 30; 20 MG/100ML; MG/100ML; MG/100ML; MG/100ML
INJECTION, SOLUTION INTRAVENOUS CONTINUOUS
Status: DISCONTINUED | OUTPATIENT
Start: 2023-10-18 | End: 2023-10-18 | Stop reason: HOSPADM

## 2023-10-18 RX ORDER — HYDROMORPHONE HCL IN WATER/PF 6 MG/30 ML
0.2 PATIENT CONTROLLED ANALGESIA SYRINGE INTRAVENOUS
Status: DISCONTINUED | OUTPATIENT
Start: 2023-10-18 | End: 2023-10-23

## 2023-10-18 RX ORDER — ONDANSETRON 4 MG/1
4 TABLET, ORALLY DISINTEGRATING ORAL EVERY 30 MIN PRN
Status: DISCONTINUED | OUTPATIENT
Start: 2023-10-18 | End: 2023-10-18 | Stop reason: HOSPADM

## 2023-10-18 RX ORDER — CEFAZOLIN SODIUM/WATER 2 G/20 ML
2 SYRINGE (ML) INTRAVENOUS
Status: DISCONTINUED | OUTPATIENT
Start: 2023-10-18 | End: 2023-10-18 | Stop reason: HOSPADM

## 2023-10-18 RX ORDER — PANTOPRAZOLE SODIUM 40 MG/1
40 TABLET, DELAYED RELEASE ORAL DAILY
Status: DISCONTINUED | OUTPATIENT
Start: 2023-10-18 | End: 2023-10-24 | Stop reason: HOSPADM

## 2023-10-18 RX ORDER — PROPOFOL 10 MG/ML
INJECTION, EMULSION INTRAVENOUS CONTINUOUS PRN
Status: DISCONTINUED | OUTPATIENT
Start: 2023-10-18 | End: 2023-10-18

## 2023-10-18 RX ORDER — BISACODYL 10 MG
10 SUPPOSITORY, RECTAL RECTAL DAILY PRN
Status: DISCONTINUED | OUTPATIENT
Start: 2023-10-18 | End: 2023-10-24 | Stop reason: HOSPADM

## 2023-10-18 RX ORDER — ONDANSETRON 2 MG/ML
4 INJECTION INTRAMUSCULAR; INTRAVENOUS EVERY 6 HOURS PRN
Status: DISCONTINUED | OUTPATIENT
Start: 2023-10-18 | End: 2023-10-24 | Stop reason: HOSPADM

## 2023-10-18 RX ORDER — BUPIVACAINE HYDROCHLORIDE AND EPINEPHRINE 2.5; 5 MG/ML; UG/ML
INJECTION, SOLUTION INFILTRATION; PERINEURAL PRN
Status: DISCONTINUED | OUTPATIENT
Start: 2023-10-18 | End: 2023-10-18 | Stop reason: HOSPADM

## 2023-10-18 RX ORDER — IPRATROPIUM BROMIDE AND ALBUTEROL SULFATE 2.5; .5 MG/3ML; MG/3ML
3 SOLUTION RESPIRATORY (INHALATION) ONCE
Status: COMPLETED | OUTPATIENT
Start: 2023-10-18 | End: 2023-10-18

## 2023-10-18 RX ORDER — ENOXAPARIN SODIUM 100 MG/ML
40 INJECTION SUBCUTANEOUS
Status: COMPLETED | OUTPATIENT
Start: 2023-10-18 | End: 2023-10-18

## 2023-10-18 RX ORDER — AMOXICILLIN 250 MG
1 CAPSULE ORAL 2 TIMES DAILY
Status: DISCONTINUED | OUTPATIENT
Start: 2023-10-18 | End: 2023-10-24 | Stop reason: HOSPADM

## 2023-10-18 RX ADMIN — PHENYLEPHRINE HYDROCHLORIDE 100 MCG: 10 INJECTION INTRAVENOUS at 09:49

## 2023-10-18 RX ADMIN — PROPOFOL 200 MG: 10 INJECTION, EMULSION INTRAVENOUS at 07:32

## 2023-10-18 RX ADMIN — SUGAMMADEX 200 MG: 100 INJECTION, SOLUTION INTRAVENOUS at 14:53

## 2023-10-18 RX ADMIN — PHENYLEPHRINE HYDROCHLORIDE 100 MCG: 10 INJECTION INTRAVENOUS at 09:10

## 2023-10-18 RX ADMIN — Medication 10 MG: at 13:27

## 2023-10-18 RX ADMIN — PANTOPRAZOLE SODIUM 40 MG: 40 TABLET, DELAYED RELEASE ORAL at 19:48

## 2023-10-18 RX ADMIN — FENTANYL CITRATE 50 MCG: 50 INJECTION INTRAMUSCULAR; INTRAVENOUS at 11:36

## 2023-10-18 RX ADMIN — ACETAMINOPHEN 975 MG: 325 TABLET, FILM COATED ORAL at 06:39

## 2023-10-18 RX ADMIN — FENTANYL CITRATE 25 MCG: 50 INJECTION INTRAMUSCULAR; INTRAVENOUS at 08:29

## 2023-10-18 RX ADMIN — PHENYLEPHRINE HYDROCHLORIDE 100 MCG: 10 INJECTION INTRAVENOUS at 09:35

## 2023-10-18 RX ADMIN — Medication 2 G: at 07:56

## 2023-10-18 RX ADMIN — HYDROMORPHONE HYDROCHLORIDE 0.3 MG: 1 INJECTION, SOLUTION INTRAMUSCULAR; INTRAVENOUS; SUBCUTANEOUS at 13:18

## 2023-10-18 RX ADMIN — ACETAMINOPHEN 975 MG: 325 TABLET, FILM COATED ORAL at 19:47

## 2023-10-18 RX ADMIN — PHENYLEPHRINE HYDROCHLORIDE 100 MCG: 10 INJECTION INTRAVENOUS at 07:32

## 2023-10-18 RX ADMIN — SODIUM CHLORIDE, SODIUM LACTATE, POTASSIUM CHLORIDE, CALCIUM CHLORIDE: 600; 310; 30; 20 INJECTION, SOLUTION INTRAVENOUS at 07:34

## 2023-10-18 RX ADMIN — CLOTRIMAZOLE: 1 SOLUTION TOPICAL at 23:46

## 2023-10-18 RX ADMIN — POTASSIUM CHLORIDE, DEXTROSE MONOHYDRATE AND SODIUM CHLORIDE: 150; 5; 450 INJECTION, SOLUTION INTRAVENOUS at 17:02

## 2023-10-18 RX ADMIN — EPHEDRINE SULFATE 2.5 MG: 5 INJECTION INTRAVENOUS at 12:29

## 2023-10-18 RX ADMIN — HYDROMORPHONE HYDROCHLORIDE 0.2 MG: 1 INJECTION, SOLUTION INTRAMUSCULAR; INTRAVENOUS; SUBCUTANEOUS at 14:19

## 2023-10-18 RX ADMIN — FENTANYL CITRATE 50 MCG: 50 INJECTION INTRAMUSCULAR; INTRAVENOUS at 09:14

## 2023-10-18 RX ADMIN — PHENYLEPHRINE HYDROCHLORIDE 100 MCG: 10 INJECTION INTRAVENOUS at 09:12

## 2023-10-18 RX ADMIN — EPHEDRINE SULFATE 2.5 MG: 5 INJECTION INTRAVENOUS at 12:22

## 2023-10-18 RX ADMIN — EPHEDRINE SULFATE 10 MG: 5 INJECTION INTRAVENOUS at 09:12

## 2023-10-18 RX ADMIN — Medication 20 MG: at 09:07

## 2023-10-18 RX ADMIN — METHOCARBAMOL 500 MG: 500 TABLET ORAL at 22:22

## 2023-10-18 RX ADMIN — FENTANYL CITRATE 50 MCG: 50 INJECTION INTRAMUSCULAR; INTRAVENOUS at 08:22

## 2023-10-18 RX ADMIN — PROPOFOL 25 MCG/KG/MIN: 10 INJECTION, EMULSION INTRAVENOUS at 13:16

## 2023-10-18 RX ADMIN — FENTANYL CITRATE 25 MCG: 50 INJECTION, SOLUTION INTRAMUSCULAR; INTRAVENOUS at 17:29

## 2023-10-18 RX ADMIN — FENTANYL CITRATE 50 MCG: 50 INJECTION INTRAMUSCULAR; INTRAVENOUS at 13:25

## 2023-10-18 RX ADMIN — Medication 100 MG: at 07:32

## 2023-10-18 RX ADMIN — Medication 2 G: at 11:56

## 2023-10-18 RX ADMIN — SENNOSIDES AND DOCUSATE SODIUM 1 TABLET: 50; 8.6 TABLET ORAL at 19:48

## 2023-10-18 RX ADMIN — EPHEDRINE SULFATE 5 MG: 5 INJECTION INTRAVENOUS at 13:16

## 2023-10-18 RX ADMIN — PHENYLEPHRINE HYDROCHLORIDE 100 MCG: 10 INJECTION INTRAVENOUS at 09:02

## 2023-10-18 RX ADMIN — ENOXAPARIN SODIUM 40 MG: 40 INJECTION SUBCUTANEOUS at 07:02

## 2023-10-18 RX ADMIN — EPHEDRINE SULFATE 5 MG: 5 INJECTION INTRAVENOUS at 12:21

## 2023-10-18 RX ADMIN — PHENYLEPHRINE HYDROCHLORIDE 0.2 MCG/KG/MIN: 10 INJECTION INTRAVENOUS at 10:49

## 2023-10-18 RX ADMIN — DEXAMETHASONE SODIUM PHOSPHATE 4 MG: 4 INJECTION, SOLUTION INTRA-ARTICULAR; INTRALESIONAL; INTRAMUSCULAR; INTRAVENOUS; SOFT TISSUE at 07:36

## 2023-10-18 RX ADMIN — Medication 20 MG: at 11:56

## 2023-10-18 RX ADMIN — SODIUM CHLORIDE, POTASSIUM CHLORIDE, SODIUM LACTATE AND CALCIUM CHLORIDE: 600; 310; 30; 20 INJECTION, SOLUTION INTRAVENOUS at 07:28

## 2023-10-18 RX ADMIN — FENTANYL CITRATE 25 MCG: 50 INJECTION, SOLUTION INTRAMUSCULAR; INTRAVENOUS at 17:02

## 2023-10-18 RX ADMIN — Medication 20 MG: at 10:51

## 2023-10-18 RX ADMIN — PHENYLEPHRINE HYDROCHLORIDE 0.5 MCG/KG/MIN: 10 INJECTION INTRAVENOUS at 11:29

## 2023-10-18 RX ADMIN — FENTANYL CITRATE 25 MCG: 50 INJECTION INTRAMUSCULAR; INTRAVENOUS at 07:32

## 2023-10-18 RX ADMIN — CALCIUM CHLORIDE INJECTION 1 G: 100 INJECTION, SOLUTION INTRAVENOUS at 12:43

## 2023-10-18 RX ADMIN — ONDANSETRON 4 MG: 2 INJECTION INTRAMUSCULAR; INTRAVENOUS at 14:32

## 2023-10-18 RX ADMIN — IPRATROPIUM BROMIDE AND ALBUTEROL SULFATE 3 ML: .5; 3 SOLUTION RESPIRATORY (INHALATION) at 15:53

## 2023-10-18 RX ADMIN — SODIUM CHLORIDE, POTASSIUM CHLORIDE, SODIUM LACTATE AND CALCIUM CHLORIDE: 600; 310; 30; 20 INJECTION, SOLUTION INTRAVENOUS at 11:51

## 2023-10-18 RX ADMIN — PHENYLEPHRINE HYDROCHLORIDE 100 MCG: 10 INJECTION INTRAVENOUS at 10:06

## 2023-10-18 RX ADMIN — LIDOCAINE HYDROCHLORIDE 80 MG: 20 INJECTION, SOLUTION INFILTRATION; PERINEURAL at 07:32

## 2023-10-18 ASSESSMENT — ACTIVITIES OF DAILY LIVING (ADL)
ADLS_ACUITY_SCORE: 18
ADLS_ACUITY_SCORE: 20
ADLS_ACUITY_SCORE: 18

## 2023-10-18 NOTE — PLAN OF CARE
"Goal Outcome Evaluation:      Plan of Care Reviewed With: patient    Overall Patient Progress: no change    Outcome Evaluation: Patient arrived to 7B from PACU at 18:25. Patient alert and oriented; VSS; 2L NC. Patient NPO - except chips. Patient CT WS. G tube to gravity.    /80 (BP Location: Right arm)   Pulse 92   Temp 97.8  F (36.6  C) (Oral)   Resp 21   Ht 1.676 m (5' 6\")   Wt 77.5 kg (170 lb 13.7 oz)   SpO2 97%   BMI 27.58 kg/m        Admitted/transferred from:   2 RN full   skin assessment completed by Sanchez Lozano, RN and Alexis HAYS RN.  Skin assessment finding: issues found x7 surgical lap sites; L chest tube; G-tube; x2 PIV  bruise R anterior shin  Interventions/actions: skin interventions patient educated on need to call before attempting to get out of bed; patient educated on need for shifting weight in bed; call light within reach; adequate lighting.       Bedside Emergency Equipment Present:  Suction Regulator: Yes  Suction Canister: Yes  Tubing between Regulator and Canister: Yes  O2 Regulator with Tree: Yes  Ambu Bag: Yes      "

## 2023-10-18 NOTE — ANESTHESIA PROCEDURE NOTES
Airway       Patient location during procedure: OR       Procedure Start/Stop Times: 10/18/2023 7:34 AM  Staff -        Anesthesiologist:  Talha Bello MD       Resident/Fellow: Marcelino Rangel MD       Performed By: resident  Consent for Airway        Urgency: elective  Indications and Patient Condition       Indications for airway management: bere-procedural       Induction type:RSI       Mask difficulty assessment: 0 - not attempted    Final Airway Details       Final airway type: endotracheal airway       Successful airway: ETT - single  Endotracheal Airway Details        ETT size (mm): 7.0       Successful intubation technique: direct laryngoscopy       DL Blade Type: MAC 4       Grade View of Cords: 1       Adjucts: stylet       Position: Right       Measured from: lips       Secured at (cm): 21       Bite block used: None    Post intubation assessment        Placement verified by: capnometry, equal breath sounds and chest rise        Number of attempts at approach: 1       Ease of procedure: easy       Dentition: Intact    Medication(s) Administered   Medication Administration Time: 10/18/2023 7:34 AM

## 2023-10-18 NOTE — ANESTHESIA POSTPROCEDURE EVALUATION
Patient: Talya Walter    Procedure: Procedure(s):  Lysis of adhesions >45 minutes, Robot-assisted laparoscopic hiatal hernia repair, left chest tube placement  Esophagoscopy, gastroscopy, duodenoscopy (EGD), combined, percutaneous endoscopic gastrostomy tube placement       Anesthesia Type:  General    Note:  Disposition: Admission   Postop Pain Control: Uneventful            Sign Out: Well controlled pain   PONV: No   Neuro/Psych: Uneventful            Sign Out: Acceptable/Baseline neuro status   Airway/Respiratory: Uneventful            Sign Out: Acceptable/Baseline resp. status   CV/Hemodynamics: Uneventful            Sign Out: Acceptable CV status; No obvious hypovolemia; No obvious fluid overload   Other NRE: NONE   DID A NON-ROUTINE EVENT OCCUR? No           Last vitals:  Vitals Value Taken Time   /73 10/18/23 1715   Temp 36.6  C (97.8  F) 10/18/23 1530   Pulse 89 10/18/23 1723   Resp 11 10/18/23 1723   SpO2 96 % 10/18/23 1723   Vitals shown include unfiled device data.    Electronically Signed By: Talha Bello MD  October 18, 2023  5:25 PM

## 2023-10-18 NOTE — PLAN OF CARE
Dr. Bello at bedside. IS education initiated and practiced. Hemoglobin 9 from 11. EBL was 100 ml. 4L fluids. Left neck crepitus. Minimal tenderness.Dr. Bello aware and no concerns expressed. Patient left radial arterial line removed without issue. Ok to transfer to inpatient unit.

## 2023-10-18 NOTE — OP NOTE
Preoperative diagnosis:                         Hiatal hernia  Postoperative diagnosis:                       Hiatal hernia    Procedure:   Robotic-assisted redo laparoscopic repair of hiatal hernia  EGD  Gastrostomy  Left  chest tube insertion  Lysis of adhesions for greater than 45 minutes    Anesthesia: General   Surgeon: Jarad Cheung   Assistant surgeon: Benito Mcallister MD; Ralph Pineda PA-C  EBL:  Less than 100 mL    Complications: none immediate  Findings:  Recurrent hiatal hernia, reduced     Description of procedure   The patient was brought to the room and placed supine upon the table.  After confirming the patient's identity and signed the consent, appropriate monitoring devices were placed as well as SCD boots.  General anesthesia was administered and the patient was intubated with a single-lumen endotracheal tube. Intravenous antibiobic was administered within one hour prior to the incision.  The patient was secured to the table while remaining supine and a foot board was placed.  The abdomen was prepped with chlorhexidine and draped in standard surgical fashion.      A Veress needle was placed into the left upper quadrant and pneumoperitoneum established to 15 mm Hg.  An 8 mm trocar was placed two finger breadths to the right of midline, 8 cm from the xiphoid process. An 8 mm 30-degree laparoscope was inserted into the peritoneal cavity. There were adhesions under the old midline incision. An 8 mm port was placed in the right lower quadrant and two 8 mm ports were placed on the left side of the abdomen. Lysis of adhesions with the LigaSure took at least 45 minutes. Finally, a last 8 mm port was placed a hand's breadth to the left of the first port  A port was placed subcostal on the right and a fast clamp retractor was used to retract the liver to expose the hiatus.  Finally, a 12 mm Air seal port was placed in the right lower quadrant. The robot was easily docked after the patient was placed  into reverse trendelenburg position. The hiatus was widely patent and the hernia sac was reduced into the abdominal cavity and the plane between the sac and the mediastinum was located and mediastinal dissection continued up into the chest, freeing the sac and the esophagus circumferentially.  Care was taken to minimize injury to the pleura.  The vagus nerves were identified and preserved during the course of dissection.  The left and right crurae were dissected free as well.  The dissection continued posteriorly, freeing up the posterior portion of the sac within the mediastinum.  The short gastrics were previously divided.  Endoscopy confirmed the GE junction was appropriately located 2 cm below the hiatus.  The stomach lay within the abdominal cavity under no tension.  Three posterior sutures were placed to close the hiatus.  A 54 [place] bougie was passed into the stomach by the anesthesia team.  Three anterior stitches were placed at the hiatus. A chest tube had been placed into the left pleural cavity during the course of the operation. The cavity was inspected for hemostasis, which was noted.  The liver retractor was removed.  Pneumoperitoneum was allowed to escape and a 20 [place] PEG tube was placed.  Endoscopy showed the esophagus to be straight and there was no hiatal hernia. There was a Hill grade I gastroesophageal valve noted.  The ports were removed under direct vision and the pneumoperitoneum was allowed to escape.  The incisions were irrigated and closed in layers with 2-0 Vicryl and 4-0 Monocryl.  The area was  cleaned and dried and benzoin and steri-strips were applied.  At the end of the case, the sponge, needle and instrument counts were correct.

## 2023-10-18 NOTE — ANESTHESIA PROCEDURE NOTES
Arterial Line Procedure Note    Pre-Procedure   Staff -        Anesthesiologist:  Talha Bello MD       Resident/Fellow: Marcelino Rangel MD       Performed By: resident       Location: OR       Pre-Anesthestic Checklist: patient identified, IV checked, risks and benefits discussed, informed consent, monitors and equipment checked, pre-op evaluation and at physician/surgeon's request  Timeout:       Correct Patient: Yes        Correct Procedure: Yes        Correct Site: Yes        Correct Position: Yes   Line Placement:   This line was placed Post Induction  Procedure   Procedure: arterial line       Laterality: left       Insertion Site: radial.  Sterile Prep        Standard elements of sterile barrier followed       Skin prep: Chloraprep  Insertion/Injection        Technique: ultrasound guided        1. Ultrasound was used to evaluate the access site.       2. Artery evaluated via ultrasound for patency/adequacy.       3. Using real-time ultrasound the needle/catheter was observed entering the artery/vein.       Catheter Type/Size: 20 G, 1.75 in/4.5 cm quick cath (integral wire)  Narrative        Tegaderm dressing used.       Complications: None apparent,        Arterial waveform: Yes        IBP within 10% of NIBP: Yes

## 2023-10-18 NOTE — ANESTHESIA CARE TRANSFER NOTE
Patient: Talya Walter    Procedure: Procedure(s):  Lysis of adhesions >45 minutes, Robot-assisted laparoscopic hiatal hernia repair, left chest tube placement  Esophagoscopy, gastroscopy, duodenoscopy (EGD), combined, percutaneous endoscopic gastrostomy tube placement       Diagnosis: Hiatal hernia with gastroesophageal reflux [K44.9, K21.9]  Diagnosis Additional Information: No value filed.    Anesthesia Type:   General     Note:    Oropharynx: spontaneously breathing  Level of Consciousness: awake  Oxygen Supplementation: face mask  Level of Supplemental Oxygen (L/min / FiO2): 6  Independent Airway: airway patency satisfactory and stable  Dentition: dentition unchanged  Vital Signs Stable: post-procedure vital signs reviewed and stable  Report to RN Given: handoff report given  Patient transferred to: PACU    Handoff Report: Identifed the Patient, Identified the Reponsible Provider, Reviewed the pertinent medical history, Discussed the surgical course, Reviewed Intra-OP anesthesia mangement and issues during anesthesia, Set expectations for post-procedure period and Allowed opportunity for questions and acknowledgement of understanding    Vitals:  Vitals Value Taken Time   /67 10/18/23 1516   Temp     Pulse 93 10/18/23 1518   Resp 26 10/18/23 1518   SpO2 95 % 10/18/23 1518   Vitals shown include unfiled device data.    Electronically Signed By: Marcelino Rangel MD  October 18, 2023  3:19 PM

## 2023-10-18 NOTE — OR NURSING
Paged on call thoracic with concerns of pt not holding sats, on 6L simple mask O@ 95%. Pt lungs also sounding course, with diaphragm breathing.     [facility] called. Made aware of pt status, stated as of right now he's okay if patient needs a couple of liters of O2. Has seen CXR, small right pneumo, no chest tube at this time. Will call lab for follow up on orders. Will continue to monitor.

## 2023-10-19 ENCOUNTER — APPOINTMENT (OUTPATIENT)
Dept: CT IMAGING | Facility: CLINIC | Age: 78
DRG: 327 | End: 2023-10-19
Attending: THORACIC SURGERY (CARDIOTHORACIC VASCULAR SURGERY)
Payer: MEDICARE

## 2023-10-19 ENCOUNTER — APPOINTMENT (OUTPATIENT)
Dept: OCCUPATIONAL THERAPY | Facility: CLINIC | Age: 78
DRG: 327 | End: 2023-10-19
Attending: THORACIC SURGERY (CARDIOTHORACIC VASCULAR SURGERY)
Payer: MEDICARE

## 2023-10-19 ENCOUNTER — APPOINTMENT (OUTPATIENT)
Dept: GENERAL RADIOLOGY | Facility: CLINIC | Age: 78
DRG: 327 | End: 2023-10-19
Attending: THORACIC SURGERY (CARDIOTHORACIC VASCULAR SURGERY)
Payer: MEDICARE

## 2023-10-19 LAB
ABO/RH(D): NORMAL
ALBUMIN SERPL BCG-MCNC: 3.4 G/DL (ref 3.5–5.2)
ALP SERPL-CCNC: 63 U/L (ref 35–104)
ALT SERPL W P-5'-P-CCNC: 257 U/L (ref 0–50)
ANION GAP SERPL CALCULATED.3IONS-SCNC: 7 MMOL/L (ref 7–15)
ANTIBODY SCREEN: NEGATIVE
APTT PPP: 57 SECONDS (ref 22–38)
AST SERPL W P-5'-P-CCNC: 268 U/L (ref 0–45)
BILIRUB SERPL-MCNC: 0.5 MG/DL
BLD PROD TYP BPU: NORMAL
BLD PROD TYP BPU: NORMAL
BLOOD COMPONENT TYPE: NORMAL
BLOOD COMPONENT TYPE: NORMAL
BUN SERPL-MCNC: 12.8 MG/DL (ref 8–23)
CALCIUM SERPL-MCNC: 8.7 MG/DL (ref 8.8–10.2)
CHLORIDE SERPL-SCNC: 104 MMOL/L (ref 98–107)
CODING SYSTEM: NORMAL
CODING SYSTEM: NORMAL
CREAT SERPL-MCNC: 0.7 MG/DL (ref 0.51–0.95)
CROSSMATCH: NORMAL
CROSSMATCH: NORMAL
DEPRECATED HCO3 PLAS-SCNC: 25 MMOL/L (ref 22–29)
EGFRCR SERPLBLD CKD-EPI 2021: 88 ML/MIN/1.73M2
ERYTHROCYTE [DISTWIDTH] IN BLOOD BY AUTOMATED COUNT: 15.6 % (ref 10–15)
FACT VIII ACT/NOR PPP: 141 % (ref 55–200)
FACT VIII ACT/NOR PPP: 161 % (ref 55–200)
GLUCOSE BLDC GLUCOMTR-MCNC: 148 MG/DL (ref 70–99)
GLUCOSE SERPL-MCNC: 134 MG/DL (ref 70–99)
HCT VFR BLD AUTO: 22.8 % (ref 35–47)
HGB BLD-MCNC: 7.4 G/DL (ref 11.7–15.7)
HGB BLD-MCNC: 7.6 G/DL (ref 11.7–15.7)
HOLD SPECIMEN: NORMAL
ISSUE DATE AND TIME: NORMAL
MCH RBC QN AUTO: 35.9 PG (ref 26.5–33)
MCHC RBC AUTO-ENTMCNC: 32.5 G/DL (ref 31.5–36.5)
MCV RBC AUTO: 111 FL (ref 78–100)
PLATELET # BLD AUTO: 206 10E3/UL (ref 150–450)
POTASSIUM SERPL-SCNC: 4.7 MMOL/L (ref 3.4–5.3)
PROT SERPL-MCNC: 5.4 G/DL (ref 6.4–8.3)
RADIOLOGIST FLAGS: ABNORMAL
RBC # BLD AUTO: 2.06 10E6/UL (ref 3.8–5.2)
RETICS # AUTO: 0.06 10E6/UL (ref 0.03–0.1)
RETICS/RBC NFR AUTO: 2.9 % (ref 0.5–2)
SODIUM SERPL-SCNC: 136 MMOL/L (ref 135–145)
SPECIMEN EXPIRATION DATE: NORMAL
UNIT ABO/RH: NORMAL
UNIT ABO/RH: NORMAL
UNIT NUMBER: NORMAL
UNIT NUMBER: NORMAL
UNIT STATUS: NORMAL
UNIT STATUS: NORMAL
UNIT TYPE ISBT: 5100
UNIT TYPE ISBT: 5100
VWF AG ACT/NOR PPP IA: 227 % (ref 50–200)
VWF AG ACT/NOR PPP IA: 238 % (ref 50–200)
VWF:AC ACT/NOR PPP IA: 91 % (ref 50–180)
VWF:RCO ACT/NOR PPP PL AGG: <10 %
WBC # BLD AUTO: 14.1 10E3/UL (ref 4–11)

## 2023-10-19 PROCEDURE — 258N000003 HC RX IP 258 OP 636: Performed by: STUDENT IN AN ORGANIZED HEALTH CARE EDUCATION/TRAINING PROGRAM

## 2023-10-19 PROCEDURE — 97535 SELF CARE MNGMENT TRAINING: CPT | Mod: GO

## 2023-10-19 PROCEDURE — 86923 COMPATIBILITY TEST ELECTRIC: CPT | Performed by: PHYSICIAN ASSISTANT

## 2023-10-19 PROCEDURE — 36415 COLL VENOUS BLD VENIPUNCTURE: CPT | Performed by: PHYSICIAN ASSISTANT

## 2023-10-19 PROCEDURE — 86901 BLOOD TYPING SEROLOGIC RH(D): CPT

## 2023-10-19 PROCEDURE — 250N000013 HC RX MED GY IP 250 OP 250 PS 637: Performed by: STUDENT IN AN ORGANIZED HEALTH CARE EDUCATION/TRAINING PROGRAM

## 2023-10-19 PROCEDURE — 36415 COLL VENOUS BLD VENIPUNCTURE: CPT | Performed by: STUDENT IN AN ORGANIZED HEALTH CARE EDUCATION/TRAINING PROGRAM

## 2023-10-19 PROCEDURE — 71045 X-RAY EXAM CHEST 1 VIEW: CPT | Mod: 76

## 2023-10-19 PROCEDURE — 85730 THROMBOPLASTIN TIME PARTIAL: CPT | Performed by: PHYSICIAN ASSISTANT

## 2023-10-19 PROCEDURE — 86923 COMPATIBILITY TEST ELECTRIC: CPT

## 2023-10-19 PROCEDURE — 250N000011 HC RX IP 250 OP 636

## 2023-10-19 PROCEDURE — 85045 AUTOMATED RETICULOCYTE COUNT: CPT | Performed by: PHYSICIAN ASSISTANT

## 2023-10-19 PROCEDURE — 71045 X-RAY EXAM CHEST 1 VIEW: CPT

## 2023-10-19 PROCEDURE — 97165 OT EVAL LOW COMPLEX 30 MIN: CPT | Mod: GO

## 2023-10-19 PROCEDURE — 85245 CLOT FACTOR VIII VW RISTOCTN: CPT | Performed by: PHYSICIAN ASSISTANT

## 2023-10-19 PROCEDURE — 97110 THERAPEUTIC EXERCISES: CPT | Mod: GO

## 2023-10-19 PROCEDURE — 120N000003 HC R&B IMCU UMMC

## 2023-10-19 PROCEDURE — 85018 HEMOGLOBIN: CPT | Performed by: STUDENT IN AN ORGANIZED HEALTH CARE EDUCATION/TRAINING PROGRAM

## 2023-10-19 PROCEDURE — 85246 CLOT FACTOR VIII VW ANTIGEN: CPT | Performed by: PHYSICIAN ASSISTANT

## 2023-10-19 PROCEDURE — 71275 CT ANGIOGRAPHY CHEST: CPT | Mod: MF

## 2023-10-19 PROCEDURE — 250N000011 HC RX IP 250 OP 636: Mod: JZ | Performed by: STUDENT IN AN ORGANIZED HEALTH CARE EDUCATION/TRAINING PROGRAM

## 2023-10-19 PROCEDURE — 85027 COMPLETE CBC AUTOMATED: CPT | Performed by: STUDENT IN AN ORGANIZED HEALTH CARE EDUCATION/TRAINING PROGRAM

## 2023-10-19 PROCEDURE — P9016 RBC LEUKOCYTES REDUCED: HCPCS | Performed by: STUDENT IN AN ORGANIZED HEALTH CARE EDUCATION/TRAINING PROGRAM

## 2023-10-19 PROCEDURE — 80053 COMPREHEN METABOLIC PANEL: CPT | Performed by: STUDENT IN AN ORGANIZED HEALTH CARE EDUCATION/TRAINING PROGRAM

## 2023-10-19 PROCEDURE — 97530 THERAPEUTIC ACTIVITIES: CPT | Mod: GO

## 2023-10-19 PROCEDURE — 86923 COMPATIBILITY TEST ELECTRIC: CPT | Performed by: STUDENT IN AN ORGANIZED HEALTH CARE EDUCATION/TRAINING PROGRAM

## 2023-10-19 PROCEDURE — 85240 CLOT FACTOR VIII AHG 1 STAGE: CPT | Performed by: PHYSICIAN ASSISTANT

## 2023-10-19 PROCEDURE — 74177 CT ABD & PELVIS W/CONTRAST: CPT | Mod: MG

## 2023-10-19 PROCEDURE — 71045 X-RAY EXAM CHEST 1 VIEW: CPT | Mod: 26 | Performed by: RADIOLOGY

## 2023-10-19 PROCEDURE — 74177 CT ABD & PELVIS W/CONTRAST: CPT | Mod: 26 | Performed by: RADIOLOGY

## 2023-10-19 PROCEDURE — G1010 CDSM STANSON: HCPCS | Performed by: RADIOLOGY

## 2023-10-19 PROCEDURE — 71275 CT ANGIOGRAPHY CHEST: CPT | Mod: 26 | Performed by: RADIOLOGY

## 2023-10-19 PROCEDURE — 99221 1ST HOSP IP/OBS SF/LOW 40: CPT | Mod: FS | Performed by: PHYSICIAN ASSISTANT

## 2023-10-19 PROCEDURE — G1010 CDSM STANSON: HCPCS | Mod: GC | Performed by: RADIOLOGY

## 2023-10-19 RX ORDER — TRIAMCINOLONE ACETONIDE 1 MG/G
OINTMENT TOPICAL DAILY
COMMUNITY

## 2023-10-19 RX ORDER — IOPAMIDOL 755 MG/ML
105 INJECTION, SOLUTION INTRAVASCULAR ONCE
Status: COMPLETED | OUTPATIENT
Start: 2023-10-19 | End: 2023-10-19

## 2023-10-19 RX ADMIN — POTASSIUM CHLORIDE, DEXTROSE MONOHYDRATE AND SODIUM CHLORIDE: 150; 5; 450 INJECTION, SOLUTION INTRAVENOUS at 05:44

## 2023-10-19 RX ADMIN — IOPAMIDOL 105 ML: 755 INJECTION, SOLUTION INTRAVENOUS at 15:32

## 2023-10-19 RX ADMIN — POLYETHYLENE GLYCOL 3350 17 G: 17 POWDER, FOR SOLUTION ORAL at 09:06

## 2023-10-19 RX ADMIN — PANTOPRAZOLE SODIUM 40 MG: 40 TABLET, DELAYED RELEASE ORAL at 09:23

## 2023-10-19 RX ADMIN — ENOXAPARIN SODIUM 40 MG: 40 INJECTION SUBCUTANEOUS at 09:17

## 2023-10-19 RX ADMIN — SENNOSIDES AND DOCUSATE SODIUM 1 TABLET: 50; 8.6 TABLET ORAL at 09:07

## 2023-10-19 RX ADMIN — OXYCODONE HYDROCHLORIDE 5 MG: 5 TABLET ORAL at 09:07

## 2023-10-19 RX ADMIN — AMINOCAPROIC ACID 5 G: 250 INJECTION, SOLUTION INTRAVENOUS at 17:22

## 2023-10-19 RX ADMIN — HYDROMORPHONE HYDROCHLORIDE 0.2 MG: 0.2 INJECTION, SOLUTION INTRAMUSCULAR; INTRAVENOUS; SUBCUTANEOUS at 05:46

## 2023-10-19 RX ADMIN — SENNOSIDES AND DOCUSATE SODIUM 1 TABLET: 50; 8.6 TABLET ORAL at 20:06

## 2023-10-19 RX ADMIN — OXYCODONE HYDROCHLORIDE 5 MG: 5 TABLET ORAL at 01:59

## 2023-10-19 RX ADMIN — POTASSIUM CHLORIDE, DEXTROSE MONOHYDRATE AND SODIUM CHLORIDE: 150; 5; 450 INJECTION, SOLUTION INTRAVENOUS at 18:36

## 2023-10-19 RX ADMIN — AMINOCAPROIC ACID 1 G/HR: 250 INJECTION, SOLUTION INTRAVENOUS at 18:30

## 2023-10-19 RX ADMIN — CLOTRIMAZOLE: 1 SOLUTION TOPICAL at 20:07

## 2023-10-19 RX ADMIN — OXYCODONE HYDROCHLORIDE 10 MG: 10 TABLET ORAL at 13:45

## 2023-10-19 RX ADMIN — ACETAMINOPHEN 975 MG: 325 TABLET, FILM COATED ORAL at 01:58

## 2023-10-19 RX ADMIN — ACETAMINOPHEN 975 MG: 325 TABLET, FILM COATED ORAL at 20:06

## 2023-10-19 RX ADMIN — CLOTRIMAZOLE: 1 SOLUTION TOPICAL at 09:08

## 2023-10-19 RX ADMIN — ACETAMINOPHEN 975 MG: 325 TABLET, FILM COATED ORAL at 12:24

## 2023-10-19 RX ADMIN — AMINOCAPROIC ACID 1 G/HR: 250 INJECTION, SOLUTION INTRAVENOUS at 22:59

## 2023-10-19 RX ADMIN — OXYCODONE HYDROCHLORIDE 10 MG: 10 TABLET ORAL at 18:22

## 2023-10-19 RX ADMIN — METHOCARBAMOL 500 MG: 500 TABLET ORAL at 06:40

## 2023-10-19 RX ADMIN — ESCITALOPRAM OXALATE 10 MG: 10 TABLET ORAL at 09:08

## 2023-10-19 ASSESSMENT — ACTIVITIES OF DAILY LIVING (ADL)
ADLS_ACUITY_SCORE: 20
PREVIOUS_RESPONSIBILITIES: MEAL PREP;HOUSEKEEPING;LAUNDRY;SHOPPING;MEDICATION MANAGEMENT;FINANCES;DRIVING
ADLS_ACUITY_SCORE: 20

## 2023-10-19 NOTE — CONSULTS
INTERVENTIONAL RADIOLOGY CONSULT NOTE    Reason for referral:   Possible embolization. POD#1 Hiatal hernia repair, PMHx  Of vWF    History:   Talya Walter is a 78 year old female with a past medical history significant for Type 2A VWD, significant GIB and surgical bleeding history, iron deficiency anemia, with known hiatal hernia, Michael's ulcer and Harden's esophagus POD#1 s/p lysis of adhesions, robot assisted hernia repair, EGD, left chest tube placement.     IR was consulted due to a drop in hemoglobin from 11.0 on 10/18/23 at 6am to 9.0 at 450 pm on 10/18/23 and 7.4 on 10/19/23.     Imaging:   CT Chest 10/19 - Large posterior mediastinal fluid collection versus pleural effusion which is low in density. No active extravasation on the limited arterial phase chest. Moderate right pleural effusion.Trace right basilar effusion. Hyperdense foci near proximal stomach which do not suppress on virtual noncontrast images and do not bloom on the portal venous phase abdomen CT (I.e., not extravasation).    CT abdomen 10/19/23: This is a portal venous phase exam which unfortunately is neither sensitive nor specific for active extravasation. There is a small rectus sheath hematoma.    Assessment:   Talya Walter is a 78 year old with pertinent history of vWF who is POD1 s/p hiatal hernia repair. Postoperatively her hemoglobin has trended from 11 yesterday morning to 7.4 this afternoon. CT chest and abdomen/pelvis were obtained and without obvious source for the 4 unit decrease in hemoglobin. There is a small rectus sheath hematoma. A focus of active extravasation was questioned on a preliminary interpretation - without delayed imaging however this is inconclusive. The size of this hematoma is quite small unlikely to explain a 4 point drop in hemoglobin. Lastly, rectus sheath hematomas often resolve with conservative management and do not typically necessitate embolization.    In terms of potential other sources for  her hemoglobin drop - she some low density pleural fluid and possible mediastinal fluid collections. These appear low density and favored more simple in appearance.      Recommendations/Plan:   - No indication for embolization as there is no target for embolization   - Apply abdominal binder for rectus sheath hematoma.  - Serial hemoglobin checks  - Hold anticoagulation if on any  - Additional recommendations per hematology.  - If patient's clinical status deteriorates and/or there is an acute drop in hemoglobin please re-page IR      If procedure has been approved, IR charge RN can be contacted at [phone] for estimated time of procedure.     Discussed with Dr. Mcmahon.  Antonio Brownlee M.D.  Interventional Radiology PGY-5  IR pass pager: [phone]    -----------------------------------------------    Images reviewed. Discussed with Dr. Brownlee. Agree with assessment and plan.

## 2023-10-19 NOTE — PLAN OF CARE
Goal Outcome Evaluation:      Plan of Care Reviewed With: patient    Overall Patient Progress: improvingOverall Patient Progress: improving     Temp: 98.8  F (37.1  C) Temp src: Oral BP: 122/68 Pulse: 95   Resp: 26 SpO2: 92 % O2 Device: Nasal cannula Oxygen Delivery: 2 LPM    A&Ox4. Pain improving, given oxycodone, dilaudid, robaxin, and scheduled tylenol. No SOB, oxymask 3-5 LPM overnight. Lung sounds clear and diminished. No BM, not passing gas, BS audible. Left chest tube to water seal, no air leak.Small amount of crepitus left upper chest. Chest and abdominal incisions covered with dressings, marked no new drainage. Up with assist of 1 to bathroom, voiding. Port saline locked. Right PIV saline locked. Left PIV infusing MF at 75 mL/hr.

## 2023-10-19 NOTE — PLAN OF CARE
Goal Outcome Evaluation:  A&Ox4.Pt intermittently desats especially while pt is up moving  to 90 to 91 % on 3 LPM oxymask; O2 is increased to 4 LPM oxymask. Denies SOB. Chest tube removed this morning per thoracic provider. Uses IS with encouragement.  G tube to gravity, clamped in between medications. G tube with 200 ml thin brown OP.   Up with SBA to bathroom. Pt walked in rojas with physical therapy.Sat on a chair for an hour. Adequate UOP. Pt takes sips of water.  Abdominal incision site pain controlled with scheduled tylenol 975 mg tab po and PRN oxy 5 mg tab  po x1, oxy 10 mg tab po x1 and PRN robaxin.  Hgb 7.4; stat  CT PE ordered to see source of bleeding.  Pt with one of her incision site actively bleeding at the end of the shift, pressure applied on site and thoracic team notified.  Continue with POC

## 2023-10-19 NOTE — PROGRESS NOTES
"  Thoracic Surgery Progress Note  Surgery Cross-Cover  Post Op Check    10/19/2023    Talya Walter is a 78 year old female POD#0 s/p Procedure(s):  Lysis of adhesions >45 minutes, Robot-assisted laparoscopic hiatal hernia repair, left chest tube placement  Esophagoscopy, gastroscopy, duodenoscopy (EGD), combined, percutaneous endoscopic gastrostomy tube placement for Pre-Op Diagnosis Codes:     * Hiatal hernia with gastroesophageal reflux [K44.9, K21.9]    Pt reports their pain is controlled with the current regimen. Denies nausea, SOB, chest pain, or dizziness. Patient Is passing flatus but has not yet had a BM and Is voiding spontaneously. Overall feels well and has been able to rest throughout the night.    /68 (BP Location: Left arm, Patient Position: Semi-Higgins's, Cuff Size: Adult Regular)   Pulse 95   Temp 98.8  F (37.1  C) (Oral)   Resp 26   Ht 1.676 m (5' 6\")   Wt 77.5 kg (170 lb 13.7 oz)   SpO2 92%   BMI 27.58 kg/m      Gen: A&O x4, NAD   Chest: breathing non-labored on nasal cannula at 2 liters per minute   Abdomen: soft, appropriately-tender, non-distended  Incision x7: clean, dry, intact covered by dressings.   Extremities: warm and well perfused  Devices: Nasal cannula and left chest tube and G tube  to gravity    A/P: No acute post-op issues. Continue plan of care per primary team. Please call with any questions.    Th note was co-written by Lizandro Garcia MS3.    Gin ADAMSBS  PGY1 General Surgery  [facility]  Sur[facility]  "

## 2023-10-19 NOTE — PROGRESS NOTES
Ordered home ear drops clotrimazole 5 drops three times a day each ear.    Gin MARIANO  PGY1 General Surgery  [facility]  Surgery Cross [facility]

## 2023-10-19 NOTE — PROGRESS NOTES
Resident/Fellow Attestation   I, Gerardo Armstrong MD, was present with the medical student who participated in the service and in the documentation of the note.  I have verified the history and personally performed the physical exam and medical decision making.  I agree with the assessment and plan of care as documented in the note.      eGrardo Armstrong MD  PGY1  Date of Service (when I saw the patient): 10/19/23    Procedure Note: Pleural Tube Removal     The patient was informed of the procedure to remove the pleural tube and explained the possible risks with the procedure and elected to proceed. The patients Pleural tube was clamped and the dressings were removed. The patients sutures were removed and the patient was instructed to take a breath in and hold as the tube was removed with minimal drainage. The site was covered with Vaseline gauze and an occlusive dressing and the patient was instructed to resume normal breathing. The patient was informed to tell their nurse if they had any chest pain or shortness of breath after the procedure. The procedure was tolerated well without any complications. Chest xray results are pending at this time.      This note was co-written by Huseyin Sawant MS4.

## 2023-10-19 NOTE — CONSULTS
"  Hematology Consult Note   Date of Service: 10/19/2023    Patient: Talya Walter  MRN: [id number]  Admission Date: 10/18/2023  Hospital Day # 1   Primary Outpatient Hematologist: Dr. Lares, [facility]      Reason for Consult: post operative management with VWD    History of Present Illness:    Talya Walter is a 78 year old female with a past medical history significant for Type 2A VWD, significant GIB and surgical bleeding history, iron deficiency anemia, with known hiatal hernia, Michael's ulcer and Harden's esophagus POD#1 s/p lysis of adhesions, robot assisted hernia repair, EGD, left chest tube placement.     She was consulted preoperatively by hematology with recommendations to receive VWF concentrate VonVendi 50 U/kg before procedure with 1-2 more possible doses postoperatively to prevent surgical bleeding with goal of keeping her VWF activity >80% for approximately one week after the procedure. She received 3990 units of VonVendi 10/17/23 at 1515 with surgery taking place 10/18 at 0825.     Surgery went well without immediate complications. EBL < 100 mL.     She is feeling well this morning. She has been up and moving around. No dizziness or shortness of breath. Voiding without hematuria. Has not yet had a bowel movement. Her pain is well controlled. Her chest tube was removed this morning.     Hematologic History:  Original concern was raised for a bleeding disorder when she started losing her \"baby teeth\".  She has significant bleeding with loss of these deciduous teeth which eventually led to an evaluation for von Willebrand disease where she was living at the time, in [facility].  She was eventually diagnosed with von Willebrand disease.     Age 13, bleeding with menarche. She reports severe prolonged menstrual periods and eventually was put on oral contraceptives to control this at a very young age.  She also recalls that by age 18 when she was " attending [facility], she passed out from severe iron deficiency anemia related to heavy menstrual periods even despite use of oral contraceptives.     Major bleeding with childbirth. This occurred in the late 60s and early 70s where there is limited access to factor concentrate. She then recalls that at age 32 (1977) it was recommended that she have a hysterectomy.  However, given fears about the risk of bleeding, instead she received radiation to her ovaries which very rapidly induced a menopausal state.     2004: major abdominal exploratory surgery for ongoing problems with GI bleeding and iron deficiency anemia.  This was also complicated by bleeding and required transfusion.     2010: started working with a local hematologist who recommended weekly prophylaxis with humate to try to get her GI bleeding to stop.  She had done well with this treatment but still did intermittently require IV iron.  Discontinued for at least 3 years.      2020: hospitalized for an acute bleed which was found to be related to a large hiatal hernia and bleeding Michael ulcers. She was given blood transfusion and Humate-P.      May 2023: developed GI bleed and was hospitalized with acute blood loss anemia. She was given Humate-P and transfused total of 2 units of PRBCs.     Review of Systems: Pertinent positive and negative systems described in HPI; the remainder of the 14 systems are negative    Past Medical History:  Past Medical History:   Diagnosis Date    Von Willebrand's disease (H)     type 2A       Past Surgical History:  Past Surgical History:   Procedure Laterality Date    LAPAROTOMY EXPLORATORY         Social History:  Social History     Socioeconomic History    Marital status: Patient Declined     Spouse name: None    Number of children: None    Years of education: None    Highest education level: None   Tobacco Use    Smoking status: Former     Types: Cigarettes     Quit date: 1985     Years since quitting:  "38.8     Passive exposure: Past    Smokeless tobacco: Never   Substance and Sexual Activity    Alcohol use: Yes     Comment: 1-2 glasses of wine daily    Drug use: Never        Family History  Family History   Problem Relation Age of Onset    Anesthesia Reaction No family hx of        Outpatient Medications:  No current facility-administered medications on file prior to encounter.  clotrimazole (LOTRIMIN) 1 % external solution, Instill 5 drops into each ear 3 (three) times daily.  escitalopram (LEXAPRO) 10 MG tablet, Take 10 mg by mouth every morning  Ferrous Sulfate (IRON) 28 MG TABS, Take 1 tablet by mouth every morning  FOLIC ACID PO, Take 1 tablet by mouth daily  glucosamine-chondroitin 500-400 MG CAPS per capsule, Take 1 capsule by mouth every morning  omeprazole (PRILOSEC) 40 MG DR capsule, Take 40 mg by mouth 2 times daily  sucralfate (CARAFATE) 1 GM tablet, Take 1 g by mouth 4 times daily  triamcinolone (KENALOG) 0.1 % external ointment, Apply topically daily  UNABLE TO FIND, Take 1 capsule by mouth every morning MEDICATION NAME: Stool softener  vitamin C (ASCORBIC ACID) 500 MG tablet, Take 1 tablet by mouth every morning  Vitamin D3 (CHOLECALCIFEROL) 25 mcg (1000 units) tablet, Take 1 tablet by mouth every morning       Physical Exam:    /46   Pulse 98   Temp 97.9  F (36.6  C) (Oral)   Resp 20   Ht 1.676 m (5' 6\")   Wt 77.5 kg (170 lb 13.7 oz)   SpO2 94%   BMI 27.58 kg/m    Gen: Resting comfortably   CV: Normal rate, regular rhythm.  Pulm: breathing non-labored on nasal cannula at 2 liters per minute   Abd: not examined   Ext: Warm and well perfused. No lower extremity edema.  Skin: No rash or cyanosis, bruise R anterior shin  Neuro: awake alert and oriented, moving all extremities     Labs & Studies: I personally reviewed the following studies:  ROUTINE LABS (Last four results):  [facility]  Recent Labs   Lab 10/19/23  0639 10/18/23  1649   NA  --  137   POTASSIUM  --  4.6   CHLORIDE  --  105   CO2  " --  21*   ANIONGAP  --  11   * 168*   BUN  --  12.8   CR  --  0.64   GFRESTIMATED  --  90   JACKIE  --  9.0   MAG  --  1.5*     CBC  Recent Labs   Lab 10/18/23  1649 10/18/23  0600   WBC 11.9*  --    RBC 2.49*  --    HGB 9.0* 11.0*   HCT 27.6*  --    *  --    MCH 36.1*  --    MCHC 32.6  --    RDW 15.6*  --      --      INRNo lab results found in last 7 days.    Assessment & Plan:   Talya Walter is a 78 year old female with a past medical history significant for Type 2A VWD, significant GIB and surgical bleeding history, iron deficiency anemia, with known hiatal hernia, Michael's ulcer and Harden's esophagus POD#1 s/p lysis of adhesions, robot assisted hernia repair, EGD, left chest tube placement.     Labs collected yesterday afternoon are showing a good response to VonVendi with VWF antigen of 227% and activity of 90%. We will continue to monitor these levels with the goal of keeping of VWF activity for >80% for approximately a week postoperatively.     She is clinically stable with no evidence of acute bleeding.    Recommendations:   - Continue to monitor daily VWF antigen and activity   -Hematology will continue following to determine if additional VonVendi is indicated      Patient was seen and plan of care was discussed with attending physician Dr. Cogan.    We will continue to follow this patient. Please don't hesitate to contact the Fellow On-Call with questions.    EKATERINA LouisS    I have personally evaluated the patient and confirmed the history, physical exam, and medical decision-making. I reviewed the student's note and agree with the assessment and plan as documented. Any pertinent changes were made to the body of the note, as indicated.    Wendy Dorman PA-C  Benign Hematology  [phone]

## 2023-10-19 NOTE — PROGRESS NOTES
Brief hematology note    77-year-old woman with von Willebrand's disease admitted for elective hernia repair.  Received Vonvendi 2 days ago, von Willebrand levels were robust preoperatively and currently.  She has had a substantial hemoglobin drop since the procedure (11 g/dL preoperatively, now 7 g/dL) and complains of abdominal pain.  There are reports of oozing during the procedure and currently concern for bleeding.  The normal von Willebrand factor levels and factor VIII level argue against the bleeding being due to von Willebrand disease.  CT is pending to evaluate for active intra-abdominal bleeding. In the meantime, would recommend the following:    -Start aminocaproic acid IV drip to promote mucosal hemostasis: Initial bolus of 5g, then 1 g/h for 8 hours   -Once above is completed, transition to 3 g IV every 8 hours   -Can give 1 dose of Humate-P 20 units/kg - however, we wound not recommend this, and this would be done with the understanding of an increased risk of thrombosis with infusing factor product on top of already normal factor VIII levels     Jacob Cogan, MD  Hematology

## 2023-10-19 NOTE — PROGRESS NOTES
"Resident/Fellow Attestation  I, Gerardo Armstrong MD, was present with the medical student who participated in the service and in the documentation of the note. I have verified the history and personally performed the physical exam and medical decision making. I agree with the assessment and plan of care as documented in the note.      Gerardo Armstrong MD  PGY1  Date of Service (when I saw the patient): 10/19/23    Brief Progress note:   The patient was seen this afternoon. She is currently POD #1 s/p hiatal hernia repair She felt well other than some mild abdominal pain. Hemoglobin has decreased from 11 preoperatively to 7.4 this afternoon. The patient has had a slight increase in oxygen requirements to 4 LPM but denies any chest pain, dizziness, lightheadedness, or change in her shortness of breath at this time. CT PE and CT A/P w/ contrast ordered to evaluate for source of hemoglobin drop.     Due to history of von Willebrands disease Heme consulted who recommended the following:  \"-Start aminocaproic acid IV drip to promote mucosal hemostasis: Initial bolus of 5g, then 1 g/h for 8 hours   -Once above is completed, transition to 3 g IV every 8 hours   -Can give 1 dose of Humate-P 20 units/kg - however, we wound not recommend this, and this would be done with the understanding of an increased risk of thrombosis with infusing factor product on top of already normal factor VIII levels.\"    1u of pRBC and Aminocaproic acid IV drip ordered at this time. Further interventions pending imaging results.     This plan was discussed with attending Dr. Godoy.     This note was co-written by Huseyin Sawant MS4.         "

## 2023-10-19 NOTE — PHARMACY-ADMISSION MEDICATION HISTORY
Pharmacist Admission Medication History    Admission medication history is complete. The information provided in this note is only as accurate as the sources available at the time of the update.    Information Source(s): Patient and CareEverywhere/SureScripts via in-person    Pertinent Information:   - Patient was a reliable historian able to confirm all medication names, strengths, frequencies, and last doses.   - Patient reports using clotrimazole external solution as 5 drops into each ear 3 times daily for fungal infection in the ear. Confirmed with chart review this is how it is intended to be used.     Changes made to PTA medication list:  Added:   Added folic acid. Patient not familiar with home strength so added as a generic PO entry.   Triamcinolone 0.1 % cream  Deleted: None  Changed:   Changed clotrimazole ointment to 5 drops in each ear daily. See note above.       Allergies reviewed with patient and updates made in EHR: yes    Medication History Completed By: Eze Michaud [facility] 10/19/2023 10:24 AM    Prior to Admission medications    Medication Sig Last Dose Taking? Auth Provider Long Term End Date   clotrimazole (LOTRIMIN) 1 % external solution Instill 5 drops into each ear 3 (three) times daily. 10/17/2023 at 2100 Yes Reported, Patient     escitalopram (LEXAPRO) 10 MG tablet Take 10 mg by mouth every morning 10/18/2023 at 0000 Yes Reported, Patient Yes    Ferrous Sulfate (IRON) 28 MG TABS Take 1 tablet by mouth every morning 10/17/2023 at 0900 Yes Reported, Patient     FOLIC ACID PO Take 1 tablet by mouth daily 10/17/2023 at 0800 Yes Unknown, Entered By History     glucosamine-chondroitin 500-400 MG CAPS per capsule Take 1 capsule by mouth every morning Past Week at 0900 Yes Reported, Patient     omeprazole (PRILOSEC) 40 MG DR capsule Take 40 mg by mouth 2 times daily 10/18/2023 at 0000 Yes Reported, Patient     sucralfate (CARAFATE) 1 GM tablet Take 1 g by mouth 4 times daily 10/17/2023 at 2000 Yes  Reported, Patient     triamcinolone (KENALOG) 0.1 % external ointment Apply topically daily Past Week at 0800 Yes Unknown, Entered By History     UNABLE TO FIND Take 1 capsule by mouth every morning MEDICATION NAME: Stool softener 10/17/2023 at 0800 Yes Reported, Patient     vitamin C (ASCORBIC ACID) 500 MG tablet Take 1 tablet by mouth every morning Past Week at 0800 Yes Reported, Patient     Vitamin D3 (CHOLECALCIFEROL) 25 mcg (1000 units) tablet Take 1 tablet by mouth every morning 10/17/2023 at 0900 Yes Reported, Patient

## 2023-10-20 ENCOUNTER — APPOINTMENT (OUTPATIENT)
Dept: OCCUPATIONAL THERAPY | Facility: CLINIC | Age: 78
DRG: 327 | End: 2023-10-20
Attending: THORACIC SURGERY (CARDIOTHORACIC VASCULAR SURGERY)
Payer: MEDICARE

## 2023-10-20 LAB
ALBUMIN SERPL BCG-MCNC: 3 G/DL (ref 3.5–5.2)
ALP SERPL-CCNC: 54 U/L (ref 35–104)
ALT SERPL W P-5'-P-CCNC: 186 U/L (ref 0–50)
ANION GAP SERPL CALCULATED.3IONS-SCNC: 5 MMOL/L (ref 7–15)
APTT PPP: 36 SECONDS (ref 22–38)
AST SERPL W P-5'-P-CCNC: 150 U/L (ref 0–45)
BILIRUB SERPL-MCNC: 0.4 MG/DL
BLAIMP ISLT/SPM QL: NOT DETECTED
BLAKPC ISLT/SPM QL: NOT DETECTED
BLAOXA-48 ISLT/SPM QL: NOT DETECTED
BLAVIM ISLT/SPM QL: NOT DETECTED
BLD PROD TYP BPU: NORMAL
BLOOD COMPONENT TYPE: NORMAL
BUN SERPL-MCNC: 9 MG/DL (ref 8–23)
CALCIUM SERPL-MCNC: 8.3 MG/DL (ref 8.8–10.2)
CHLORIDE SERPL-SCNC: 107 MMOL/L (ref 98–107)
CODING SYSTEM: NORMAL
CREAT SERPL-MCNC: 0.53 MG/DL (ref 0.51–0.95)
CROSSMATCH: NORMAL
DEPRECATED HCO3 PLAS-SCNC: 24 MMOL/L (ref 22–29)
EGFRCR SERPLBLD CKD-EPI 2021: >90 ML/MIN/1.73M2
ERYTHROCYTE [DISTWIDTH] IN BLOOD BY AUTOMATED COUNT: 20.8 % (ref 10–15)
FACT VIII ACT/NOR PPP: 141 % (ref 55–200)
FERRITIN SERPL-MCNC: 317 NG/ML (ref 11–328)
FIBRINOGEN PPP-MCNC: 501 MG/DL (ref 170–490)
GLUCOSE SERPL-MCNC: 124 MG/DL (ref 70–99)
HCT VFR BLD AUTO: 20.3 % (ref 35–47)
HGB BLD-MCNC: 6.7 G/DL (ref 11.7–15.7)
HGB BLD-MCNC: 7.4 G/DL (ref 11.7–15.7)
HGB BLD-MCNC: 7.8 G/DL (ref 11.7–15.7)
HGB BLD-MCNC: 8.2 G/DL (ref 11.7–15.7)
INR PPP: 1.37 (ref 0.85–1.15)
IRON BINDING CAPACITY (ROCHE): 184 UG/DL (ref 240–430)
IRON SATN MFR SERPL: 13 % (ref 15–46)
IRON SERPL-MCNC: 24 UG/DL (ref 37–145)
ISSUE DATE AND TIME: NORMAL
MCH RBC QN AUTO: 34.7 PG (ref 26.5–33)
MCHC RBC AUTO-ENTMCNC: 33 G/DL (ref 31.5–36.5)
MCV RBC AUTO: 105 FL (ref 78–100)
NDM TARGET DNA: NOT DETECTED
PATH REPORT.COMMENTS IMP SPEC: NORMAL
PATH REPORT.COMMENTS IMP SPEC: NORMAL
PATH REPORT.FINAL DX SPEC: NORMAL
PATH REPORT.GROSS SPEC: NORMAL
PATH REPORT.MICROSCOPIC SPEC OTHER STN: NORMAL
PATH REPORT.RELEVANT HX SPEC: NORMAL
PHOTO IMAGE: NORMAL
PLATELET # BLD AUTO: 151 10E3/UL (ref 150–450)
POTASSIUM SERPL-SCNC: 4.2 MMOL/L (ref 3.4–5.3)
PROT SERPL-MCNC: 4.8 G/DL (ref 6.4–8.3)
RBC # BLD AUTO: 1.93 10E6/UL (ref 3.8–5.2)
RETICS # AUTO: 0.06 10E6/UL (ref 0.03–0.1)
RETICS/RBC NFR AUTO: 2.9 % (ref 0.5–2)
SODIUM SERPL-SCNC: 136 MMOL/L (ref 135–145)
UNIT ABO/RH: NORMAL
UNIT NUMBER: NORMAL
UNIT STATUS: NORMAL
UNIT TYPE ISBT: 5100
VIT B12 SERPL-MCNC: 1038 PG/ML (ref 232–1245)
VWF AG ACT/NOR PPP IA: 237 % (ref 50–200)
VWF:AC ACT/NOR PPP IA: 116 % (ref 50–180)
WBC # BLD AUTO: 8.6 10E3/UL (ref 4–11)

## 2023-10-20 PROCEDURE — 250N000011 HC RX IP 250 OP 636: Performed by: THORACIC SURGERY (CARDIOTHORACIC VASCULAR SURGERY)

## 2023-10-20 PROCEDURE — 85240 CLOT FACTOR VIII AHG 1 STAGE: CPT | Performed by: PHYSICIAN ASSISTANT

## 2023-10-20 PROCEDURE — 80053 COMPREHEN METABOLIC PANEL: CPT | Performed by: STUDENT IN AN ORGANIZED HEALTH CARE EDUCATION/TRAINING PROGRAM

## 2023-10-20 PROCEDURE — 85610 PROTHROMBIN TIME: CPT | Performed by: PHYSICIAN ASSISTANT

## 2023-10-20 PROCEDURE — 85027 COMPLETE CBC AUTOMATED: CPT | Performed by: STUDENT IN AN ORGANIZED HEALTH CARE EDUCATION/TRAINING PROGRAM

## 2023-10-20 PROCEDURE — 85384 FIBRINOGEN ACTIVITY: CPT | Performed by: PHYSICIAN ASSISTANT

## 2023-10-20 PROCEDURE — 97535 SELF CARE MNGMENT TRAINING: CPT | Mod: GO

## 2023-10-20 PROCEDURE — 82607 VITAMIN B-12: CPT | Performed by: PHYSICIAN ASSISTANT

## 2023-10-20 PROCEDURE — 85245 CLOT FACTOR VIII VW RISTOCTN: CPT | Performed by: PHYSICIAN ASSISTANT

## 2023-10-20 PROCEDURE — 85730 THROMBOPLASTIN TIME PARTIAL: CPT | Performed by: PHYSICIAN ASSISTANT

## 2023-10-20 PROCEDURE — 85018 HEMOGLOBIN: CPT | Performed by: STUDENT IN AN ORGANIZED HEALTH CARE EDUCATION/TRAINING PROGRAM

## 2023-10-20 PROCEDURE — 85246 CLOT FACTOR VIII VW ANTIGEN: CPT | Performed by: PHYSICIAN ASSISTANT

## 2023-10-20 PROCEDURE — 250N000009 HC RX 250

## 2023-10-20 PROCEDURE — 250N000015 HC RX FACTOR IP MED 636 OP 636: Mod: JZ

## 2023-10-20 PROCEDURE — 85045 AUTOMATED RETICULOCYTE COUNT: CPT | Performed by: PHYSICIAN ASSISTANT

## 2023-10-20 PROCEDURE — 83550 IRON BINDING TEST: CPT | Performed by: PHYSICIAN ASSISTANT

## 2023-10-20 PROCEDURE — 82728 ASSAY OF FERRITIN: CPT | Performed by: PHYSICIAN ASSISTANT

## 2023-10-20 PROCEDURE — 250N000011 HC RX IP 250 OP 636: Mod: JZ | Performed by: STUDENT IN AN ORGANIZED HEALTH CARE EDUCATION/TRAINING PROGRAM

## 2023-10-20 PROCEDURE — 87798 DETECT AGENT NOS DNA AMP: CPT | Performed by: INTERNAL MEDICINE

## 2023-10-20 PROCEDURE — 120N000003 HC R&B IMCU UMMC

## 2023-10-20 PROCEDURE — 99232 SBSQ HOSP IP/OBS MODERATE 35: CPT | Mod: FS

## 2023-10-20 PROCEDURE — 258N000003 HC RX IP 258 OP 636: Performed by: STUDENT IN AN ORGANIZED HEALTH CARE EDUCATION/TRAINING PROGRAM

## 2023-10-20 PROCEDURE — 250N000013 HC RX MED GY IP 250 OP 250 PS 637: Performed by: STUDENT IN AN ORGANIZED HEALTH CARE EDUCATION/TRAINING PROGRAM

## 2023-10-20 PROCEDURE — 94640 AIRWAY INHALATION TREATMENT: CPT

## 2023-10-20 PROCEDURE — P9016 RBC LEUKOCYTES REDUCED: HCPCS

## 2023-10-20 PROCEDURE — 999N000157 HC STATISTIC RCP TIME EA 10 MIN

## 2023-10-20 PROCEDURE — 258N000003 HC RX IP 258 OP 636: Performed by: THORACIC SURGERY (CARDIOTHORACIC VASCULAR SURGERY)

## 2023-10-20 RX ORDER — IPRATROPIUM BROMIDE AND ALBUTEROL SULFATE 2.5; .5 MG/3ML; MG/3ML
3 SOLUTION RESPIRATORY (INHALATION)
Status: COMPLETED | OUTPATIENT
Start: 2023-10-20 | End: 2023-10-20

## 2023-10-20 RX ORDER — IPRATROPIUM BROMIDE AND ALBUTEROL SULFATE 2.5; .5 MG/3ML; MG/3ML
3 SOLUTION RESPIRATORY (INHALATION)
Status: DISCONTINUED | OUTPATIENT
Start: 2023-10-20 | End: 2023-10-24 | Stop reason: HOSPADM

## 2023-10-20 RX ADMIN — CLOTRIMAZOLE: 1 SOLUTION TOPICAL at 08:17

## 2023-10-20 RX ADMIN — METHOCARBAMOL 500 MG: 500 TABLET ORAL at 10:59

## 2023-10-20 RX ADMIN — IPRATROPIUM BROMIDE AND ALBUTEROL SULFATE 3 ML: .5; 3 SOLUTION RESPIRATORY (INHALATION) at 15:02

## 2023-10-20 RX ADMIN — OXYCODONE HYDROCHLORIDE 5 MG: 5 TABLET ORAL at 21:39

## 2023-10-20 RX ADMIN — ESCITALOPRAM OXALATE 10 MG: 10 TABLET ORAL at 08:14

## 2023-10-20 RX ADMIN — CLOTRIMAZOLE: 1 SOLUTION TOPICAL at 19:56

## 2023-10-20 RX ADMIN — SODIUM CHLORIDE: 900 INJECTION, SOLUTION INTRAVENOUS at 16:56

## 2023-10-20 RX ADMIN — HYDROMORPHONE HYDROCHLORIDE 0.2 MG: 0.2 INJECTION, SOLUTION INTRAMUSCULAR; INTRAVENOUS; SUBCUTANEOUS at 12:23

## 2023-10-20 RX ADMIN — SENNOSIDES AND DOCUSATE SODIUM 1 TABLET: 50; 8.6 TABLET ORAL at 19:55

## 2023-10-20 RX ADMIN — ACETAMINOPHEN 975 MG: 325 TABLET, FILM COATED ORAL at 12:23

## 2023-10-20 RX ADMIN — SENNOSIDES AND DOCUSATE SODIUM 1 TABLET: 50; 8.6 TABLET ORAL at 08:14

## 2023-10-20 RX ADMIN — ANTIHEMOPHILIC FACTOR/VON WILLEBRAND FACTOR COMPLEX (HUMAN) 1467 VWF UNIT: KIT at 09:21

## 2023-10-20 RX ADMIN — ACETAMINOPHEN 975 MG: 325 TABLET, FILM COATED ORAL at 19:55

## 2023-10-20 RX ADMIN — SODIUM CHLORIDE: 900 INJECTION, SOLUTION INTRAVENOUS at 08:22

## 2023-10-20 RX ADMIN — ACETAMINOPHEN 975 MG: 325 TABLET, FILM COATED ORAL at 04:42

## 2023-10-20 RX ADMIN — PANTOPRAZOLE SODIUM 40 MG: 40 TABLET, DELAYED RELEASE ORAL at 08:14

## 2023-10-20 RX ADMIN — CLOTRIMAZOLE: 1 SOLUTION TOPICAL at 13:30

## 2023-10-20 RX ADMIN — POTASSIUM CHLORIDE, DEXTROSE MONOHYDRATE AND SODIUM CHLORIDE: 150; 5; 450 INJECTION, SOLUTION INTRAVENOUS at 08:08

## 2023-10-20 RX ADMIN — POLYETHYLENE GLYCOL 3350 17 G: 17 POWDER, FOR SOLUTION ORAL at 08:14

## 2023-10-20 ASSESSMENT — ACTIVITIES OF DAILY LIVING (ADL)
ADLS_ACUITY_SCORE: 22
ADLS_ACUITY_SCORE: 20
ADLS_ACUITY_SCORE: 22
ADLS_ACUITY_SCORE: 20
ADLS_ACUITY_SCORE: 20
ADLS_ACUITY_SCORE: 22
ADLS_ACUITY_SCORE: 20
ADLS_ACUITY_SCORE: 22

## 2023-10-20 NOTE — PROGRESS NOTES
Hgb stable (7.6 @ 20:05, 7.4 @ 1:16 AM). Assessed the patient bedside. Patient A&Ox4, vitals WNL, patient was sleeping. Oozing from the G tube site and LLQ incision site still bleeding and was reinforced by the RN. Discussed with Dr Viky Aquino. Continue to monitor.    Gin MARIANO  PGY1 General Surgery  [facility]  Surgery Cross Cover

## 2023-10-20 NOTE — PROGRESS NOTES
CLINICAL NUTRITION SERVICES    Reason for Assessment:  Nutrition education regarding diet s/p nissen or similar surgery    Diet History:  Pt admits to not receiving nutrition education on diet advancement and stages of diet advancement s/p nissen or similar surgery.    Nutrition Diagnosis:  Food- and nutrition-related knowledge deficit r/t no previous knowledge of anticipated diet s/p nissen or similar procedure AEB pt report of not receiving nutrition education on s/p nissen or similar surgery in the past.      Interventions:  Nutrition Education  1.  Provided verbal instruction on diet s/p nissen fundoplication or related surgery.  Discussed foods/beverages at each stage of the diet (clear liquids, full liquids, semi-solid, and regular foods) and anticipated diet.  Also, reviewed helpful hints when starting solid food and ways of dealing with potential side effects post-op.    2.  Provided handout:  Diet Guidelines for a Nissen Fundoplication   3.  Collaboration and Referral of Nutrition care - Pt's G-tube is not intended to be used for TFs unless otherwise specified by the team.      Goals:   1.  Patient will verbalize at least two foods or beverages at each stage of the diet.    2.  Pt to verbalize anticipated duration of each diet stage.      Follow-up:    Patient to ask any further nutrition-related questions before discharge.  In addition, pt may request outpatient RD appointment.    Alexa Pimentel, MPH, RDN, LD  6B + Obs RD pager: [phone]  Weekend/Holiday RD pager [phone]

## 2023-10-20 NOTE — PLAN OF CARE
Neuro: A&Ox4. Bilateral HAs at beside.  Cardiac: VSS.   Respiratory: Sating >93% on Oxymask at 3 lpm. Dyspnea on exertion. Wheezing noted on both upper lobes of the lungs.  GI/: Adequate urine output via bathroom. No BM this shift.   Diet/appetite: On clear liquids diet.   Activity:  Assist of 1, up to bathroom. Commode ordered.  Pain: At acceptable level on current regimen. PRN Oxcodone given once.   Skin: 6 lap sites on abdominal area, covered in abd binder. Continuous bleeding on L upper and lower sites, dressing reinforced with abd pads.   LDA's: PEG for meds, clamped for one hour then drain to gravity. R PIV on SL. L PIV on D5+NaCl+Kcl drip at 75 ml/hr.    Plan: Latest Hgb 6.7, for PRBC 1 unit transfusion. Continue to monitor bleeding on abd lap sites. Unit collect for labs. Continue with POC. Notify primary team with changes.

## 2023-10-20 NOTE — PLAN OF CARE
"BP (!) 143/76   Pulse 92   Temp 98  F (36.7  C) (Axillary)   Resp 20   Ht 1.676 m (5' 6\")   Wt 83.4 kg (183 lb 13.8 oz)   SpO2 93%   BMI 29.68 kg/m      Neuro: A&Ox4.   Cardiac: no tele VSS.   Respiratory: RA. Lots of wheezing expiratory. One time dose of  breathing treatment requested.   GI/: Adequate urine output. No bm.   Diet/appetite: Tolerating clear liquid diet. Eating well.  Activity:  Assist of 1, up to chair and in halls.  Pain: At acceptable level on current regimen.   Skin: No new deficits noted.  LDA's: Lap site L abdomen bleeding. Site care done 6x this shift. Quick clot attempted. PIV x2, port    Plan: Continue with POC. Notify primary team with changes.  One unit prbc done, factor 8 given. And q8 infusion for bleeding.     "

## 2023-10-20 NOTE — PLAN OF CARE
Transfer  Transferred from   Via:bed  Reason for transfer: Pt appropriate for 6B- clotting concerns, for monitoring  Family: Aware of transfer  Belongings: Received with pt  Chart: Received with pt  Medications: Meds received from old unit with pt  Code Status verified on armband: yes  2 RN Skin Assessment Completed By: JEANNETTE Glover, JEANNETTE Daugherty  Med rec completed: yes  Suction/Ambu bag/Flowmeter at bedside: yes    Report received from: JEANNETTE Rodriguez  Pt status: Alert and oriented x 4. Stable. For monitoring of bleeding from lap sites.

## 2023-10-20 NOTE — PROGRESS NOTES
Hematology  Daily Progress Note   Date of Service: 10/20/2023    Patient: Talya Walter  MRN: [id number]  Admission Date: 10/18/2023  Hospital Day # Hospital Day: 3      Initial Reason for Consult: post operative management with VWD     Assessment & Plan:   Talya Walter is a 78 year old female with a past medical history significant for Type 2A VWD, significant GIB and surgical bleeding history, iron deficiency anemia, with known hiatal hernia, Michael's ulcer and Harden's esophagus POD#2 s/p lysis of adhesions, robot assisted hernia repair, EGD, left chest tube placement.      On POD she had oozing from incision sites and drop in hb from 11 pre-op to 7, CTA was done to evaluate for bleeding and showed small rectus sheath hematoma, not felt to be large enough to explain 4 gram hg drop when evaluated by IR.  Hb again trending down today but she appears to have responded to transfusions with repeat hgb of 8.2.  She does continue to ooze from incision sites  Today's labs showing a von Willebrand factor activity of 116, von Willebrand antigen of 237, and factor VIII assay of 141 (however these were drawn after a dose of humate P). Based on her continual normal von Willebrand factor levels and normal factor VIII level, would not attribute her recent drop and hemoglobin and oozing to her underlying VWD.   Other labs tests ordered to evaluate for alternate bleeding diathesis to explain oozing were unrevealing and  included fibrinogen and peripheral smear, which were not consistent with DIC and aPTT which was WNL.      Recommendations:   - Will continue to follow VWF antigen and activity levels tomorrow. Do not plan to check these Sunday unless there is a change in her condition (ordered for you)  - Continue antifibrinolytics, aminocaproic acid IV 3 g IV q8 hours.   - agree with transfusions as indicated to maintain hemoglobin     Patient was seen and plan of care was discussed with attending physician   "Cogan.    We will continue to follow this patient. Please don't hesitate to contact the Fellow On-Call with questions.    I spent 15 minutes face-to-face or coordinating care of Talya Walter.  Over 50% of our time on the unit was spent counseling the patient and/or coordinating care regarding vwd, bleeding     GURU Louis  I have personally evaluated the patient and confirmed the history, physical exam, and medical decision-making. I reviewed the student's note and agree with the assessment and plan as documented. Any pertinent changes were made to the body of the note, as indicated.    Wendy Dorman PA-C  Benign Hematology  [phone]    ___________________________________________________________________  Subjective & Interval History:    Hgb drop yesterday to 7.4 from 11 g/dL preoperatively with oozing from G-tube site. CT PE and A/P completed with PE CT negative and A/P showing new right rectus sheath hematoma with questionable extravasation. IR consulted without indication for intervention with opinion that hematoma is unlikely to explain her 4 gram hemoglobin drop. IV aminocaproic acid given as well as 1u pRBC.     Hemoglobin down to 6.7 this morning. She was given additional unit of pRBC,1 dose of Humate-P this AM 1,467 units, and amicar.      Patient reports continuous oozing from G-tube site and LLQ incision. She reports her bandages needing to be changed every 2-3 hours. She denies any additional bleeding sites, bruising, or increased abdominal pain. She has been ambulating to the bathroom without issue. No dizziness or lightheadedness.     Physical Exam:    BP (!) 143/76   Pulse 92   Temp 98  F (36.7  C) (Axillary)   Resp 20   Ht 1.676 m (5' 6\")   Wt 83.4 kg (183 lb 13.8 oz)   SpO2 92%   BMI 29.68 kg/m    Gen: Resting comfortably   CV: Normal rate, regular rhythm.  Pulm: breathing non-labored on oxy mask, 4 liters    Abd: abdominal binder in place. Mild upper quadrant tenderness. Oozing of " bright red blood noted on bandages covering G tube site and LLQ incision site  Ext: Warm and well perfused. No lower extremity edema.  Skin: No rash or cyanosis  Neuro: awake alert and oriented, moving all extremities     Labs & Studies: I personally reviewed the following studies:  ROUTINE LABS (Last four results):  CMP  Recent Labs   Lab 10/20/23  0607 10/19/23  1319 10/19/23  0639 10/18/23  1649    136  --  137   POTASSIUM 4.2 4.7  --  4.6   CHLORIDE 107 104  --  105   CO2 24 25  --  21*   ANIONGAP 5* 7  --  11   * 134* 148* 168*   BUN 9.0 12.8  --  12.8   CR 0.53 0.70  --  0.64   GFRESTIMATED >90 88  --  90   JACKIE 8.3* 8.7*  --  9.0   MAG  --   --   --  1.5*   PROTTOTAL 4.8* 5.4*  --   --    ALBUMIN 3.0* 3.4*  --   --    BILITOTAL 0.4 0.5  --   --    ALKPHOS 54 63  --   --    * 268*  --   --    * 257*  --   --      CBC  Recent Labs   Lab 10/20/23  1228 10/20/23  0607 10/20/23  0116 10/19/23  2005 10/19/23  1319 10/18/23  1649   WBC  --  8.6  --   --  14.1* 11.9*   RBC  --  1.93*  --   --  2.06* 2.49*   HGB 8.2* 6.7* 7.4* 7.6* 7.4* 9.0*   HCT  --  20.3*  --   --  22.8* 27.6*   MCV  --  105*  --   --  111* 111*   MCH  --  34.7*  --   --  35.9* 36.1*   MCHC  --  33.0  --   --  32.5 32.6   RDW  --  20.8*  --   --  15.6* 15.6*   PLT  --  151  --   --  206 209     INR  Recent Labs   Lab 10/20/23  1228   INR 1.37*       Medications list for reference:  Current Facility-Administered Medications   Medication    acetaminophen (TYLENOL) Suppository 650 mg    [START ON 10/21/2023] acetaminophen (TYLENOL) tablet 650 mg    acetaminophen (TYLENOL) tablet 975 mg    bisacodyl (DULCOLAX) suppository 10 mg    clotrimazole (LOTRIMIN) 1 % solution *PATIENT SUPPLIED*    dextrose 5% and 0.45% NaCl + KCl 20 mEq/L infusion    [Held by provider] enoxaparin ANTICOAGULANT (LOVENOX) injection 40 mg    escitalopram (LEXAPRO) tablet 10 mg    HYDROmorphone (DILAUDID) injection 0.2 mg    Or    HYDROmorphone (DILAUDID)  injection 0.4 mg    magnesium hydroxide (MILK OF MAGNESIA) suspension 30 mL    methocarbamol (ROBAXIN) tablet 500 mg    naloxone (NARCAN) injection 0.2 mg    Or    naloxone (NARCAN) injection 0.4 mg    Or    naloxone (NARCAN) injection 0.2 mg    Or    naloxone (NARCAN) injection 0.4 mg    ondansetron (ZOFRAN ODT) ODT tab 4 mg    Or    ondansetron (ZOFRAN) injection 4 mg    oxyCODONE (ROXICODONE) tablet 5 mg    Or    oxyCODONE IR (ROXICODONE) tablet 10 mg    pantoprazole (PROTONIX) EC tablet 40 mg    polyethylene glycol (MIRALAX) Packet 17 g    prochlorperazine (COMPAZINE) injection 5 mg    Or    prochlorperazine (COMPAZINE) tablet 5 mg    senna-docusate (SENOKOT-S/PERICOLACE) 8.6-50 MG per tablet 1 tablet    sodium chloride 0.9 % 150 mL with aminocaproic acid 3 g infusion

## 2023-10-20 NOTE — PROVIDER NOTIFICATION
2144H Provider Gin Trent notified Hgb 7.6 at 8pm.     2204H Provider called and said to continue to notify when next Hgb result at 1am and 7am are available.    0143H Provider notified of latest Hgb 7.4. Also informed that dressing is being reinforced for now on the bleeding site on the LLQ. No complaints of pain on that area.     0147H Provider called back, visited unit.    0305H Pt has CMP & CBC w/Plt at 6am then Hgb at 7am. Inquired if will separate the CBC and Hgb or ok w/getting all of them at 7am.    0312H Call received from provider confirming ok to do all labs at 6am.    0643H Provider Gerardo Armstrong informed of critical result Hgb 6.7.     0655H Callback received from provider w/new orders for PRBC 1 unit transfusion and Factor VIII injection.

## 2023-10-20 NOTE — PROGRESS NOTES
THORACIC & FOREGUT SURGERY    S:  No acute overnight events. Patient vitally stable overnight, continuing oozing from G-tube site and LLQ incision, bandage redressed overnight. No nausea, vomiting, dizziness, lightheadedness reported. Received 1u pRBC for hemoglobin drop. Hemoglobin stable overnight. Pt seen at bedside resting comfortably.       O:  Vitals:    10/19/23 2020 10/19/23 2055 10/20/23 0055 10/20/23 0442   BP: 128/57 125/72 111/67 130/75   BP Location: Left arm Left arm Left arm Left arm   Patient Position:       Cuff Size: Adult Regular Adult Regular Adult Regular Adult Regular   Pulse: 92 85 71 79   Resp: 22 20 22 20   Temp: 97.2  F (36.2  C) 98.3  F (36.8  C) 97.5  F (36.4  C) 97.5  F (36.4  C)   TempSrc: Oral Oral Axillary Oral   SpO2: 92% 96% 100% 98%   Weight:  83.2 kg (183 lb 6.8 oz)  83.4 kg (183 lb 13.8 oz)   Height:         A&Ox3, NAD  Breathing non-labored 3L Oxymask  RRR  Soft, Appropriately tender   Distal extremities warm. No calf tenderness.    A/P: Talya Walter is a 78 year old female with PMHx of vWF now POD#2 s/p Lysis of adhesions >45 minutes, Robot-assisted laparoscopic hiatal hernia repair, left chest tube placement, Esophagoscopy, gastroscopy, duodenoscopy (EGD), combined, percutaneous endoscopic gastrostomy tube placement for Hiatal hernia with GERD. Chest tube removed 10/19. Drop in hemoglobin yesterday to 7.4 (11.0 pre-op), CT PE & A/P completed to evaluate for source of bleeding. CT PE negative, CT A/P showing new right rectus sheath hematoma with questionable extravasation. IR consulted 10/19, no indication for any intervention, continue to trend hemoglobin. Heme consulted yesterday for history of vWF, Aminocaproic acid given yesterday as well as 1u pRBC. Hemoglobin 6.7 this morning, giving additional 1u pRBC and 1 dose of Humate-P.     Pain/neuro : dilaudid, oxy, robaxan, tylenol   - PTA lexapro resumed   CV: monitor for arrhythmia or tachycardia  Resp: Stable on 3-5L  Oxymask  - IS  GI: G-tube clamped, CLD, will advance to FLD this afternoon if tolerating  - Senna and miralax  Hem: Heme consult, aminocaproic acid and 1u pRBC given yesterday   - Following up final recommendations   - Hb 6.7 this morning, transfuse 1u pRBC   - Giving 1 dose of Humate-P this morning  PPx: Lovenox (HELD) and Protonix  Renal/FEN: IVF until tolerating CLD, will discontinue later  Drains/lines/tubes with settings : G-tube (clamped)  PT/OT/SW: PT/OT  Dispo Inpatient     Gerardo Armstrong MD    PGY-1 Surgery

## 2023-10-20 NOTE — PLAN OF CARE
"Goal Outcome Evaluation:  BP (!) 147/64   Pulse 92   Temp 99.3  F (37.4  C) (Oral)   Resp 22   Ht 1.676 m (5' 6\")   Wt 77.5 kg (170 lb 13.7 oz)   SpO2 93%   BMI 27.58 kg/m      A&Ox4, able to make needs known. VSS on 3-5L oxymask. Encouraged IS usage. G tube with 200ml of clear tan drainage, set to gravity, clamped with PO medications. Up SBA to bathroom. Abdominal incision pain controlled with scheduled tylenol and PRN pain meds. Chest tube removed today, site CDI. Abdominal incision site bandage with drainage changed and now CDI. CT PE ordered and completed this shift. Plan to transfer pt to . Call light within reach, continue with plan of care.                       "

## 2023-10-21 ENCOUNTER — APPOINTMENT (OUTPATIENT)
Dept: GENERAL RADIOLOGY | Facility: CLINIC | Age: 78
DRG: 327 | End: 2023-10-21
Attending: THORACIC SURGERY (CARDIOTHORACIC VASCULAR SURGERY)
Payer: MEDICARE

## 2023-10-21 LAB
ALBUMIN SERPL BCG-MCNC: 3.2 G/DL (ref 3.5–5.2)
ALP SERPL-CCNC: 87 U/L (ref 35–104)
ALT SERPL W P-5'-P-CCNC: 141 U/L (ref 0–50)
ANION GAP SERPL CALCULATED.3IONS-SCNC: 7 MMOL/L (ref 7–15)
AST SERPL W P-5'-P-CCNC: 84 U/L (ref 0–45)
BILIRUB SERPL-MCNC: 0.4 MG/DL
BUN SERPL-MCNC: 5.2 MG/DL (ref 8–23)
CALCIUM SERPL-MCNC: 8.6 MG/DL (ref 8.8–10.2)
CHLORIDE SERPL-SCNC: 107 MMOL/L (ref 98–107)
CREAT SERPL-MCNC: 0.5 MG/DL (ref 0.51–0.95)
DEPRECATED HCO3 PLAS-SCNC: 24 MMOL/L (ref 22–29)
EGFRCR SERPLBLD CKD-EPI 2021: >90 ML/MIN/1.73M2
ERYTHROCYTE [DISTWIDTH] IN BLOOD BY AUTOMATED COUNT: 21.3 % (ref 10–15)
FACT VIII ACT/NOR PPP: 131 % (ref 55–200)
GLUCOSE SERPL-MCNC: 122 MG/DL (ref 70–99)
HCT VFR BLD AUTO: 23.3 % (ref 35–47)
HGB BLD-MCNC: 7 G/DL (ref 11.7–15.7)
HGB BLD-MCNC: 7.1 G/DL (ref 11.7–15.7)
HGB BLD-MCNC: 7.8 G/DL (ref 11.7–15.7)
MCH RBC QN AUTO: 33.9 PG (ref 26.5–33)
MCHC RBC AUTO-ENTMCNC: 33.5 G/DL (ref 31.5–36.5)
MCV RBC AUTO: 101 FL (ref 78–100)
PLATELET # BLD AUTO: 189 10E3/UL (ref 150–450)
POTASSIUM SERPL-SCNC: 4.2 MMOL/L (ref 3.4–5.3)
PROT SERPL-MCNC: 5.3 G/DL (ref 6.4–8.3)
RBC # BLD AUTO: 2.3 10E6/UL (ref 3.8–5.2)
SODIUM SERPL-SCNC: 138 MMOL/L (ref 135–145)
VWF AG ACT/NOR PPP IA: 211 % (ref 50–200)
VWF:AC ACT/NOR PPP IA: 92 % (ref 50–180)
WBC # BLD AUTO: 9.2 10E3/UL (ref 4–11)

## 2023-10-21 PROCEDURE — 120N000003 HC R&B IMCU UMMC

## 2023-10-21 PROCEDURE — 258N000003 HC RX IP 258 OP 636: Performed by: STUDENT IN AN ORGANIZED HEALTH CARE EDUCATION/TRAINING PROGRAM

## 2023-10-21 PROCEDURE — 85240 CLOT FACTOR VIII AHG 1 STAGE: CPT | Performed by: PHYSICIAN ASSISTANT

## 2023-10-21 PROCEDURE — 250N000013 HC RX MED GY IP 250 OP 250 PS 637: Performed by: STUDENT IN AN ORGANIZED HEALTH CARE EDUCATION/TRAINING PROGRAM

## 2023-10-21 PROCEDURE — 85018 HEMOGLOBIN: CPT | Performed by: STUDENT IN AN ORGANIZED HEALTH CARE EDUCATION/TRAINING PROGRAM

## 2023-10-21 PROCEDURE — 85245 CLOT FACTOR VIII VW RISTOCTN: CPT | Performed by: PHYSICIAN ASSISTANT

## 2023-10-21 PROCEDURE — 85246 CLOT FACTOR VIII VW ANTIGEN: CPT | Performed by: PHYSICIAN ASSISTANT

## 2023-10-21 PROCEDURE — 80053 COMPREHEN METABOLIC PANEL: CPT | Performed by: STUDENT IN AN ORGANIZED HEALTH CARE EDUCATION/TRAINING PROGRAM

## 2023-10-21 PROCEDURE — 71045 X-RAY EXAM CHEST 1 VIEW: CPT

## 2023-10-21 PROCEDURE — 258N000003 HC RX IP 258 OP 636: Performed by: THORACIC SURGERY (CARDIOTHORACIC VASCULAR SURGERY)

## 2023-10-21 PROCEDURE — 250N000011 HC RX IP 250 OP 636: Mod: JZ | Performed by: STUDENT IN AN ORGANIZED HEALTH CARE EDUCATION/TRAINING PROGRAM

## 2023-10-21 PROCEDURE — 250N000011 HC RX IP 250 OP 636: Performed by: THORACIC SURGERY (CARDIOTHORACIC VASCULAR SURGERY)

## 2023-10-21 PROCEDURE — 71045 X-RAY EXAM CHEST 1 VIEW: CPT | Mod: 26 | Performed by: RADIOLOGY

## 2023-10-21 RX ORDER — ALBUTEROL SULFATE 90 UG/1
2 AEROSOL, METERED RESPIRATORY (INHALATION) EVERY 6 HOURS PRN
Status: DISCONTINUED | OUTPATIENT
Start: 2023-10-21 | End: 2023-10-24 | Stop reason: HOSPADM

## 2023-10-21 RX ORDER — HEPARIN SODIUM (PORCINE) LOCK FLUSH IV SOLN 100 UNIT/ML 100 UNIT/ML
5-10 SOLUTION INTRAVENOUS
Status: DISCONTINUED | OUTPATIENT
Start: 2023-10-21 | End: 2023-10-24 | Stop reason: HOSPADM

## 2023-10-21 RX ORDER — ALBUTEROL SULFATE 0.83 MG/ML
2.5 SOLUTION RESPIRATORY (INHALATION)
Status: CANCELLED | OUTPATIENT
Start: 2023-10-22

## 2023-10-21 RX ORDER — HEPARIN SODIUM,PORCINE 10 UNIT/ML
5-10 VIAL (ML) INTRAVENOUS EVERY 24 HOURS
Status: DISCONTINUED | OUTPATIENT
Start: 2023-10-21 | End: 2023-10-24 | Stop reason: HOSPADM

## 2023-10-21 RX ORDER — HEPARIN SODIUM,PORCINE 10 UNIT/ML
5-10 VIAL (ML) INTRAVENOUS
Status: DISCONTINUED | OUTPATIENT
Start: 2023-10-21 | End: 2023-10-24 | Stop reason: HOSPADM

## 2023-10-21 RX ADMIN — ESCITALOPRAM OXALATE 10 MG: 10 TABLET ORAL at 07:47

## 2023-10-21 RX ADMIN — OXYCODONE HYDROCHLORIDE 5 MG: 5 TABLET ORAL at 16:41

## 2023-10-21 RX ADMIN — Medication 5 ML: at 12:08

## 2023-10-21 RX ADMIN — CLOTRIMAZOLE: 1 SOLUTION TOPICAL at 07:47

## 2023-10-21 RX ADMIN — PANTOPRAZOLE SODIUM 40 MG: 40 TABLET, DELAYED RELEASE ORAL at 07:48

## 2023-10-21 RX ADMIN — ACETAMINOPHEN 975 MG: 325 TABLET, FILM COATED ORAL at 12:08

## 2023-10-21 RX ADMIN — CLOTRIMAZOLE: 1 SOLUTION TOPICAL at 13:53

## 2023-10-21 RX ADMIN — SODIUM CHLORIDE: 900 INJECTION, SOLUTION INTRAVENOUS at 07:58

## 2023-10-21 RX ADMIN — SENNOSIDES AND DOCUSATE SODIUM 1 TABLET: 50; 8.6 TABLET ORAL at 19:47

## 2023-10-21 RX ADMIN — SODIUM CHLORIDE: 900 INJECTION, SOLUTION INTRAVENOUS at 15:57

## 2023-10-21 RX ADMIN — ACETAMINOPHEN 650 MG: 325 TABLET, FILM COATED ORAL at 19:47

## 2023-10-21 RX ADMIN — POTASSIUM CHLORIDE, DEXTROSE MONOHYDRATE AND SODIUM CHLORIDE: 150; 5; 450 INJECTION, SOLUTION INTRAVENOUS at 00:29

## 2023-10-21 RX ADMIN — METHOCARBAMOL 500 MG: 500 TABLET ORAL at 09:00

## 2023-10-21 RX ADMIN — OXYCODONE HYDROCHLORIDE 5 MG: 5 TABLET ORAL at 03:47

## 2023-10-21 RX ADMIN — ACETAMINOPHEN 975 MG: 325 TABLET, FILM COATED ORAL at 03:47

## 2023-10-21 RX ADMIN — SODIUM CHLORIDE: 900 INJECTION, SOLUTION INTRAVENOUS at 00:28

## 2023-10-21 RX ADMIN — POLYETHYLENE GLYCOL 3350 17 G: 17 POWDER, FOR SOLUTION ORAL at 07:48

## 2023-10-21 RX ADMIN — CLOTRIMAZOLE: 1 SOLUTION TOPICAL at 19:47

## 2023-10-21 RX ADMIN — SENNOSIDES AND DOCUSATE SODIUM 1 TABLET: 50; 8.6 TABLET ORAL at 07:48

## 2023-10-21 ASSESSMENT — ACTIVITIES OF DAILY LIVING (ADL)
ADLS_ACUITY_SCORE: 21
ADLS_ACUITY_SCORE: 22
ADLS_ACUITY_SCORE: 20
ADLS_ACUITY_SCORE: 20
ADLS_ACUITY_SCORE: 22
ADLS_ACUITY_SCORE: 21
ADLS_ACUITY_SCORE: 22
ADLS_ACUITY_SCORE: 21
ADLS_ACUITY_SCORE: 21

## 2023-10-21 NOTE — PLAN OF CARE
Neuro: A&Ox4. Hearing aids at bedside.  Cardiac: No tele orders. VSS. Slightly elevated SBP.   Respiratory: Sating >92% via oxymask @3LPM. Expiratory wheezes.  GI/: Adequate urine output.   Diet/appetite: Tolerating Full Liquid diet.   Activity:  SBA, up to chair and commode.  Pain: Abd, shoulder, and back pain reported. PRN oxy given x2.   Skin: 7 Lap incisions. Dressings changed and Abd binder replaced.  LDA's:  Port a Cath: SL  L PIV   R PIV  G-Tube to gravity    Hgb trending down, provider notified, ordered to continue trending.     Plan: Continue with POC. Notify primary team with changes.

## 2023-10-21 NOTE — PROVIDER NOTIFICATION
"Paged Dr. Cheung: \"Aimee Mast RN 6B: DAshWAsh 220 FYI: Hmg 7.1 at 1200 down from 7.8 at 0530.\"  "

## 2023-10-21 NOTE — PLAN OF CARE
"   BP (!) 143/77 (BP Location: Left arm)   Pulse 82   Temp 98.6  F (37  C) (Oral)   Resp 28   Ht 1.676 m (5' 6\")   Wt 83.4 kg (183 lb 13.8 oz)   SpO2 94%   BMI 29.68 kg/m      Hours of Care: 2547-4302.    Neuro: Alert and oriented x4. Forgetful.  Vitals: VSS. SBP 140s.   Respiratory: Breath sounds even and unlabored. On RA in bed and in chair.  Cardiac: No tele. Hypertensive. RRR.    GI/: GT clamped draining to bag. Urinating adequate amounts.  Skin/Wounds: Lap wounds to abdomen intact with no new drainage. Hematoma across lower abdomen tender. Abdominal binder in place.  Lines: PIV to right and left forearm SL. Merry cath to chest hep locked.  Diet: Advanced to soft diet. Well tolerated. Ate eggs, yogurt and cream of wheat for breakfast.  Activity: Up with assist of 1 to chair and commode.  Pain: Robaxin this morning for abdominal pain. See MAR.  Labs: Hmg 7.8 this morning, 7.1 at noon. MD notified. No new orders at this time.    Plan: Monitoring for signs of bleeding and hmg every 8 hours.      Continue to monitor and follow POC    Aimee Mast RN on 10/21/2023 at 10:42 AM    6B-Intermediate Care    "

## 2023-10-21 NOTE — PROVIDER NOTIFICATION
"Paged Thoracic Surgery, Dr Jimenez: \"Aimee Mast RN 6B: DAshWAsh 220 FYI Hmg 7.1 at 1200 down from 7.8 at 530.\"  "

## 2023-10-21 NOTE — PROGRESS NOTES
Client is pleasant and cooperative. Alert and oriented x 3. Appearance is neat and clean. Quiet and withdrawn. Minimum interaction noted between peers. He has a good THORACIC & FOREGUT SURGERY    S:  Patient hemoglobin is stable this morning.  She is sitting up in chair and feels well.  Tolerating full liquids and requesting soft.  Making good urine but has not had a bowel movement yet    O:  Vitals:    10/20/23 1955 10/20/23 2334 10/21/23 0000 10/21/23 0136   BP:  (!) 154/91 112/56    BP Location:  Left arm Right arm    Cuff Size:  Adult Regular     Pulse:  85 76    Resp:  20     Temp:  98  F (36.7  C)     TempSrc:  Oral     SpO2: 94% 98%  96%   Weight:    83.4 kg (183 lb 13.8 oz)   Height:         A&Ox3, NAD  Breathing non-labored 3L Oxymask  RRR  Soft, Appropriately tender   Distal extremities warm. No calf tenderness.    A/P: Talya Walter is a 78 year old female with PMHx of vWF now POD#2 s/p Lysis of adhesions >45 minutes, Robot-assisted laparoscopic hiatal hernia repair, left chest tube placement, Esophagoscopy, gastroscopy, duodenoscopy (EGD), combined, percutaneous endoscopic gastrostomy tube placement for Hiatal hernia with GERD. Chest tube removed 10/19. Pt having drop in her hgb requiring pRBC. CT PE & A/P completed to evaluate for source of bleeding. CT PE negative, CT A/P showing new right rectus sheath hematoma with questionable extravasation. IR consulted 10/19, no indication for any intervention, continue to trend hemoglobin. Heme consulted, recommending Aminocaproic acid.  Hemoglobin is stable this morning    Pain/neuro : dilaudid, oxy, robaxan, tylenol   - PTA lexapro resumed   CV: monitor for arrhythmia or tachycardia  Resp: Stable on 3L NC  - IS  GI: G-tube clamped, soft diet   - Senna and miralax  Hem: Heme consult, appreciate recs   - Hb 7.8 from 7 this morning     PPx: Lovenox (HELD) and Protonix  Renal/FEN: Discontinue IVF  Drains/lines/tubes with settings : G-tube (clamped)  PT/OT/SW: PT/OT  Dispo Inpatient     Discussed with Attending Dr. Walt Mcallister MD  Surgery PGY3

## 2023-10-21 NOTE — PROGRESS NOTES
Hgb 7 @ 12:31 AM. Discussed with Dr Mcallister. Will continue to monitor and follow Hgb.    Gin ADAMSBS  PGY1 General Surgery  [facility]  Surgery Cross Cover

## 2023-10-21 NOTE — PROVIDER NOTIFICATION
Time of notification: 8:54 PM  Provider notified: Gin Trent  Patient status: Pt SpO2 88% on RA. Put on 3L via oxymask sating>94%. Has been on O2 previously. No O2 device orders can you place one? Also, FYI hgb @1900= 7.8. order to notify.     Orders received: O2 device order placed to keep SpO2 >92%    Time of notification: 0145  Provider notified: MileyGin  Patient status: KATIE hgb @0100= 7.0.     Orders received: recheck @ 0700

## 2023-10-22 LAB
ALBUMIN SERPL BCG-MCNC: 3 G/DL (ref 3.5–5.2)
ALP SERPL-CCNC: 137 U/L (ref 35–104)
ALT SERPL W P-5'-P-CCNC: 108 U/L (ref 0–50)
ANION GAP SERPL CALCULATED.3IONS-SCNC: 7 MMOL/L (ref 7–15)
AST SERPL W P-5'-P-CCNC: 59 U/L (ref 0–45)
BILIRUB SERPL-MCNC: 0.5 MG/DL
BUN SERPL-MCNC: 7.4 MG/DL (ref 8–23)
CALCIUM SERPL-MCNC: 9 MG/DL (ref 8.8–10.2)
CHLORIDE SERPL-SCNC: 105 MMOL/L (ref 98–107)
CREAT SERPL-MCNC: 0.5 MG/DL (ref 0.51–0.95)
DEPRECATED HCO3 PLAS-SCNC: 25 MMOL/L (ref 22–29)
EGFRCR SERPLBLD CKD-EPI 2021: >90 ML/MIN/1.73M2
ERYTHROCYTE [DISTWIDTH] IN BLOOD BY AUTOMATED COUNT: 20.8 % (ref 10–15)
GLUCOSE SERPL-MCNC: 118 MG/DL (ref 70–99)
HCT VFR BLD AUTO: 22.3 % (ref 35–47)
HGB BLD-MCNC: 7.4 G/DL (ref 11.7–15.7)
MCH RBC QN AUTO: 34.9 PG (ref 26.5–33)
MCHC RBC AUTO-ENTMCNC: 33.2 G/DL (ref 31.5–36.5)
MCV RBC AUTO: 105 FL (ref 78–100)
PLATELET # BLD AUTO: 196 10E3/UL (ref 150–450)
POTASSIUM SERPL-SCNC: 4.1 MMOL/L (ref 3.4–5.3)
PROT SERPL-MCNC: 5.1 G/DL (ref 6.4–8.3)
RBC # BLD AUTO: 2.12 10E6/UL (ref 3.8–5.2)
SODIUM SERPL-SCNC: 137 MMOL/L (ref 135–145)
WBC # BLD AUTO: 8 10E3/UL (ref 4–11)

## 2023-10-22 PROCEDURE — 80053 COMPREHEN METABOLIC PANEL: CPT | Performed by: STUDENT IN AN ORGANIZED HEALTH CARE EDUCATION/TRAINING PROGRAM

## 2023-10-22 PROCEDURE — 85027 COMPLETE CBC AUTOMATED: CPT | Performed by: STUDENT IN AN ORGANIZED HEALTH CARE EDUCATION/TRAINING PROGRAM

## 2023-10-22 PROCEDURE — 258N000003 HC RX IP 258 OP 636: Performed by: THORACIC SURGERY (CARDIOTHORACIC VASCULAR SURGERY)

## 2023-10-22 PROCEDURE — 250N000013 HC RX MED GY IP 250 OP 250 PS 637: Performed by: STUDENT IN AN ORGANIZED HEALTH CARE EDUCATION/TRAINING PROGRAM

## 2023-10-22 PROCEDURE — 250N000011 HC RX IP 250 OP 636: Performed by: THORACIC SURGERY (CARDIOTHORACIC VASCULAR SURGERY)

## 2023-10-22 PROCEDURE — 250N000011 HC RX IP 250 OP 636: Mod: JZ | Performed by: STUDENT IN AN ORGANIZED HEALTH CARE EDUCATION/TRAINING PROGRAM

## 2023-10-22 PROCEDURE — 250N000013 HC RX MED GY IP 250 OP 250 PS 637

## 2023-10-22 PROCEDURE — 120N000003 HC R&B IMCU UMMC

## 2023-10-22 PROCEDURE — 36415 COLL VENOUS BLD VENIPUNCTURE: CPT | Performed by: STUDENT IN AN ORGANIZED HEALTH CARE EDUCATION/TRAINING PROGRAM

## 2023-10-22 RX ADMIN — SENNOSIDES AND DOCUSATE SODIUM 1 TABLET: 50; 8.6 TABLET ORAL at 08:27

## 2023-10-22 RX ADMIN — ACETAMINOPHEN 650 MG: 325 TABLET, FILM COATED ORAL at 11:29

## 2023-10-22 RX ADMIN — SODIUM CHLORIDE: 900 INJECTION, SOLUTION INTRAVENOUS at 16:20

## 2023-10-22 RX ADMIN — SODIUM CHLORIDE: 900 INJECTION, SOLUTION INTRAVENOUS at 23:17

## 2023-10-22 RX ADMIN — ACETAMINOPHEN 650 MG: 325 TABLET, FILM COATED ORAL at 04:37

## 2023-10-22 RX ADMIN — ALBUTEROL SULFATE 2 PUFF: 90 AEROSOL, METERED RESPIRATORY (INHALATION) at 14:11

## 2023-10-22 RX ADMIN — ACETAMINOPHEN 650 MG: 325 TABLET, FILM COATED ORAL at 21:05

## 2023-10-22 RX ADMIN — Medication 5 ML: at 08:27

## 2023-10-22 RX ADMIN — ESCITALOPRAM OXALATE 10 MG: 10 TABLET ORAL at 08:26

## 2023-10-22 RX ADMIN — SODIUM CHLORIDE: 900 INJECTION, SOLUTION INTRAVENOUS at 08:20

## 2023-10-22 RX ADMIN — ALBUTEROL SULFATE 2 PUFF: 90 AEROSOL, METERED RESPIRATORY (INHALATION) at 08:27

## 2023-10-22 RX ADMIN — OXYCODONE HYDROCHLORIDE 5 MG: 5 TABLET ORAL at 14:02

## 2023-10-22 RX ADMIN — PANTOPRAZOLE SODIUM 40 MG: 40 TABLET, DELAYED RELEASE ORAL at 08:27

## 2023-10-22 RX ADMIN — ALBUTEROL SULFATE 2 PUFF: 90 AEROSOL, METERED RESPIRATORY (INHALATION) at 21:06

## 2023-10-22 RX ADMIN — POLYETHYLENE GLYCOL 3350 17 G: 17 POWDER, FOR SOLUTION ORAL at 08:26

## 2023-10-22 RX ADMIN — SODIUM CHLORIDE: 900 INJECTION, SOLUTION INTRAVENOUS at 01:07

## 2023-10-22 RX ADMIN — CLOTRIMAZOLE: 1 SOLUTION TOPICAL at 08:19

## 2023-10-22 RX ADMIN — METHOCARBAMOL 500 MG: 500 TABLET ORAL at 14:02

## 2023-10-22 RX ADMIN — CLOTRIMAZOLE: 1 SOLUTION TOPICAL at 14:06

## 2023-10-22 RX ADMIN — CLOTRIMAZOLE: 1 SOLUTION TOPICAL at 21:06

## 2023-10-22 RX ADMIN — SENNOSIDES AND DOCUSATE SODIUM 1 TABLET: 50; 8.6 TABLET ORAL at 21:06

## 2023-10-22 RX ADMIN — OXYCODONE HYDROCHLORIDE 5 MG: 5 TABLET ORAL at 04:37

## 2023-10-22 ASSESSMENT — ACTIVITIES OF DAILY LIVING (ADL)
ADLS_ACUITY_SCORE: 22

## 2023-10-22 NOTE — PROGRESS NOTES
Cross-cover note: 8:41 PM 10/21/2023     10/21/2023    General Surgery Cross Cover Note    Called by nursing regarding sputum (yellow, light pink) and wheezing     Per patient she's had sputum production since this AM and some wheezing a few days ago that resolved with nebulizer. Does not use oxygen at home. Denies chest pain, SOB, nausea, vomiting.     B/P: 136/63, T: 98.3, P: 91, R: 18    PE:  A&O, NAD   Minimal audible wheezing, able to complete full sentences, not in respiratory distress  Abd soft, non-distended, non-tender   Extr. Warm to touch    CXR- congestion vs atelectasis, small L pleural effusion    A/P: Patient satting 95% on 2L (no increase in O2 requirements).   -Ordered albuterol inhaler prn   -Can consider nebulizer if needed     Jodie Ramirez, DO  General Surgery, PGY-1   [phone]

## 2023-10-22 NOTE — PROGRESS NOTES
"THORACIC & FOREGUT SURGERY    S:  Patient hemoglobin is stable this morning, no fevers or chills. Pt states that she is feeling more weak this AM. She did not go on a walk yesterday because \"too much was going on\". She did have a BM, making urine, tolerating soft diet    O:  Vitals:    10/21/23 1947 10/21/23 2319 10/22/23 0003 10/22/23 0815   BP:   117/52 124/72   BP Location:   Right arm Right arm   Cuff Size:   Adult Regular Adult Regular   Pulse:   80 82   Resp:   20 18   Temp: 98.3  F (36.8  C)  98.3  F (36.8  C) 98.6  F (37  C)   TempSrc:   Oral Oral   SpO2:   97% 94%   Weight:  84.1 kg (185 lb 6.4 oz) 84.1 kg (185 lb 6.5 oz)    Height:         A&Ox3, NAD  Breathing non-labored RA  RRR  Soft, Appropriately tender   Distal extremities warm. No calf tenderness.    A/P: Talya Walter is a 78 year old female with PMHx of vWF now POD#2 s/p Lysis of adhesions >45 minutes, Robot-assisted laparoscopic hiatal hernia repair, left chest tube placement, Esophagoscopy, gastroscopy, duodenoscopy (EGD), combined, percutaneous endoscopic gastrostomy tube placement for Hiatal hernia with GERD. Chest tube removed 10/19. Pt having drop in her hgb requiring pRBC. CT PE & A/P completed to evaluate for source of bleeding. CT PE negative, CT A/P showing new right rectus sheath hematoma with questionable extravasation. IR consulted 10/19, no indication for any intervention, continue to trend hemoglobin. Heme consulted, recommending Aminocaproic acid.  Hemoglobin is stable 2 days in a row. Having BM and tolerating soft diet     Pain/neuro : dilaudid, oxy, robaxan, tylenol   - PTA lexapro resumed   CV: monitor for arrhythmia or tachycardia  Resp: Stable on RA  - IS  GI: G-tube clamped, soft diet   - Senna and miralax  Hem: Heme consult, appreciate recs   - Hb 7.4 this morning- CBC daily     PPx: Lovenox (HELD) and Protonix  Renal/FEN: Discontinue IVF  Drains/lines/tubes with settings : G-tube (clamped)  PT/OT/SW: PT/OT- make sure " patient is working with PT and walking at least 4 times a day   Dispo Inpatient     Discussed with Attending Dr. Walt Mcallister MD  Surgery PGY3

## 2023-10-22 NOTE — PLAN OF CARE
Care provided from 3158-5767    Neuro: A&Ox4. Pupils equal, round, and reactive to light. Speech is clear, spontaneous, and logical. Pt is hard of hearing and wears bilateral hearing aids. Pt denies headache or changes in vision/hearing.   Cardiac: Pt not on tele. Vital signs stable. Afebrile. Pt denies chest pain or palpitations. S1 and S2 audible on auscultation. Apical pulse regular.  Respiratory: Sating >94 % on room air. Pt verbalizes dyspnea on exertion. Intermittent productive cough observed. Lung sounds clear in upper lobes but diminished in middle and lower lobes bilaterally. PRN albuterol administered this AM. Pt verbalizes effective relief of respiratory symptoms with this. Pt encouraged and observed using incentive spirometer during TV commercial breaks.  GI/: Pt denies having difficulty voiding. Pt verbalizes having mild constipation despite having a bowel movement yesterday, 10/21/2023. Scheduled stool softeners administered this AM. Bowel sound audible in all quadrants. Pt denies nausea.  Diet/appetite: Tolerating regular dental diet. Pt ordered and ate approximately 75% of breakfast.   Activity:  Assist of 1/Stand-by-assist. Pt was up to chair x1 and ambulated hallways.   Pain: Pt verbalizes minor abdominal discomfort at lap sites. Pt rates pain 2/10. Pt verbalizes that minimizing sudden abdominal movements alleviates pain. Acetaminophen x1 administered.  Skin: Lap sites x7, abdominal bruising (abdominal binder in place)  LDA's: Left chest port (Heparin locked), Left peripheral IV (Saline locked), Right peripheral IV (Saline locked)    Plan: Continue with POC. Notify Thoracic team with changes.

## 2023-10-22 NOTE — PROVIDER NOTIFICATION
"Thoracic team notified via [facility] smartweb (Dr. Jodie Ramirez)    \"6B [address] Upon assessment, pt lung sounds have bilateral audible expiratory wheezes, R>L. Pt reports having coughed up thick yellowish-pink sputum recently. Minerva MACHADO [phone]\"  "

## 2023-10-23 ENCOUNTER — APPOINTMENT (OUTPATIENT)
Dept: OCCUPATIONAL THERAPY | Facility: CLINIC | Age: 78
DRG: 327 | End: 2023-10-23
Attending: THORACIC SURGERY (CARDIOTHORACIC VASCULAR SURGERY)
Payer: MEDICARE

## 2023-10-23 LAB
ALBUMIN SERPL BCG-MCNC: 3.1 G/DL (ref 3.5–5.2)
ALP SERPL-CCNC: 271 U/L (ref 35–104)
ALT SERPL W P-5'-P-CCNC: 112 U/L (ref 0–50)
ANION GAP SERPL CALCULATED.3IONS-SCNC: 8 MMOL/L (ref 7–15)
AST SERPL W P-5'-P-CCNC: 76 U/L (ref 0–45)
BILIRUB SERPL-MCNC: 0.5 MG/DL
BUN SERPL-MCNC: 9.5 MG/DL (ref 8–23)
CALCIUM SERPL-MCNC: 8.8 MG/DL (ref 8.8–10.2)
CHLORIDE SERPL-SCNC: 104 MMOL/L (ref 98–107)
CREAT SERPL-MCNC: 0.46 MG/DL (ref 0.51–0.95)
DEPRECATED HCO3 PLAS-SCNC: 24 MMOL/L (ref 22–29)
EGFRCR SERPLBLD CKD-EPI 2021: >90 ML/MIN/1.73M2
ERYTHROCYTE [DISTWIDTH] IN BLOOD BY AUTOMATED COUNT: 20.4 % (ref 10–15)
FACT VIII ACT/NOR PPP: 139 % (ref 55–200)
GLUCOSE SERPL-MCNC: 122 MG/DL (ref 70–99)
HCT VFR BLD AUTO: 21.4 % (ref 35–47)
HGB BLD-MCNC: 7.1 G/DL (ref 11.7–15.7)
MCH RBC QN AUTO: 34.6 PG (ref 26.5–33)
MCHC RBC AUTO-ENTMCNC: 33.2 G/DL (ref 31.5–36.5)
MCV RBC AUTO: 104 FL (ref 78–100)
PLATELET # BLD AUTO: 221 10E3/UL (ref 150–450)
POTASSIUM SERPL-SCNC: 4.1 MMOL/L (ref 3.4–5.3)
PROT SERPL-MCNC: 5.2 G/DL (ref 6.4–8.3)
RBC # BLD AUTO: 2.05 10E6/UL (ref 3.8–5.2)
SODIUM SERPL-SCNC: 136 MMOL/L (ref 135–145)
TSH SERPL DL<=0.005 MIU/L-ACNC: 3.56 UIU/ML (ref 0.3–4.2)
VWF AG ACT/NOR PPP IA: 214 % (ref 50–200)
VWF:AC ACT/NOR PPP IA: 66 % (ref 50–180)
VWF:AC ACT/NOR PPP IA: 90 % (ref 50–180)
WBC # BLD AUTO: 7.5 10E3/UL (ref 4–11)

## 2023-10-23 PROCEDURE — 85027 COMPLETE CBC AUTOMATED: CPT | Performed by: STUDENT IN AN ORGANIZED HEALTH CARE EDUCATION/TRAINING PROGRAM

## 2023-10-23 PROCEDURE — 999N000147 HC STATISTIC PT IP EVAL DEFER

## 2023-10-23 PROCEDURE — 258N000003 HC RX IP 258 OP 636: Performed by: THORACIC SURGERY (CARDIOTHORACIC VASCULAR SURGERY)

## 2023-10-23 PROCEDURE — 84443 ASSAY THYROID STIM HORMONE: CPT | Performed by: PHYSICIAN ASSISTANT

## 2023-10-23 PROCEDURE — 250N000011 HC RX IP 250 OP 636: Mod: JZ | Performed by: STUDENT IN AN ORGANIZED HEALTH CARE EDUCATION/TRAINING PROGRAM

## 2023-10-23 PROCEDURE — 85240 CLOT FACTOR VIII AHG 1 STAGE: CPT | Performed by: PHYSICIAN ASSISTANT

## 2023-10-23 PROCEDURE — 99232 SBSQ HOSP IP/OBS MODERATE 35: CPT | Mod: FS | Performed by: PHYSICIAN ASSISTANT

## 2023-10-23 PROCEDURE — 250N000011 HC RX IP 250 OP 636: Performed by: THORACIC SURGERY (CARDIOTHORACIC VASCULAR SURGERY)

## 2023-10-23 PROCEDURE — 97535 SELF CARE MNGMENT TRAINING: CPT | Mod: GO | Performed by: OCCUPATIONAL THERAPIST

## 2023-10-23 PROCEDURE — 85246 CLOT FACTOR VIII VW ANTIGEN: CPT | Performed by: PHYSICIAN ASSISTANT

## 2023-10-23 PROCEDURE — 80053 COMPREHEN METABOLIC PANEL: CPT | Performed by: STUDENT IN AN ORGANIZED HEALTH CARE EDUCATION/TRAINING PROGRAM

## 2023-10-23 PROCEDURE — 250N000013 HC RX MED GY IP 250 OP 250 PS 637: Performed by: STUDENT IN AN ORGANIZED HEALTH CARE EDUCATION/TRAINING PROGRAM

## 2023-10-23 PROCEDURE — 120N000003 HC R&B IMCU UMMC

## 2023-10-23 PROCEDURE — 36415 COLL VENOUS BLD VENIPUNCTURE: CPT | Performed by: PHYSICIAN ASSISTANT

## 2023-10-23 PROCEDURE — 85245 CLOT FACTOR VIII VW RISTOCTN: CPT | Performed by: PHYSICIAN ASSISTANT

## 2023-10-23 RX ADMIN — CLOTRIMAZOLE: 1 SOLUTION TOPICAL at 20:20

## 2023-10-23 RX ADMIN — ESCITALOPRAM OXALATE 10 MG: 10 TABLET ORAL at 08:31

## 2023-10-23 RX ADMIN — Medication 5 ML: at 10:01

## 2023-10-23 RX ADMIN — CLOTRIMAZOLE: 1 SOLUTION TOPICAL at 13:43

## 2023-10-23 RX ADMIN — METHOCARBAMOL 500 MG: 500 TABLET ORAL at 20:19

## 2023-10-23 RX ADMIN — SENNOSIDES AND DOCUSATE SODIUM 1 TABLET: 50; 8.6 TABLET ORAL at 20:19

## 2023-10-23 RX ADMIN — SENNOSIDES AND DOCUSATE SODIUM 1 TABLET: 50; 8.6 TABLET ORAL at 08:31

## 2023-10-23 RX ADMIN — POLYETHYLENE GLYCOL 3350 17 G: 17 POWDER, FOR SOLUTION ORAL at 08:31

## 2023-10-23 RX ADMIN — OXYCODONE HYDROCHLORIDE 5 MG: 5 TABLET ORAL at 01:09

## 2023-10-23 RX ADMIN — SODIUM CHLORIDE: 900 INJECTION, SOLUTION INTRAVENOUS at 08:35

## 2023-10-23 RX ADMIN — PANTOPRAZOLE SODIUM 40 MG: 40 TABLET, DELAYED RELEASE ORAL at 08:31

## 2023-10-23 RX ADMIN — ACETAMINOPHEN 650 MG: 325 TABLET, FILM COATED ORAL at 12:02

## 2023-10-23 RX ADMIN — SODIUM CHLORIDE: 900 INJECTION, SOLUTION INTRAVENOUS at 16:35

## 2023-10-23 RX ADMIN — ACETAMINOPHEN 650 MG: 325 TABLET, FILM COATED ORAL at 20:19

## 2023-10-23 RX ADMIN — ALBUTEROL SULFATE 2 PUFF: 90 AEROSOL, METERED RESPIRATORY (INHALATION) at 20:20

## 2023-10-23 RX ADMIN — CLOTRIMAZOLE: 1 SOLUTION TOPICAL at 08:42

## 2023-10-23 RX ADMIN — ALBUTEROL SULFATE 2 PUFF: 90 AEROSOL, METERED RESPIRATORY (INHALATION) at 05:04

## 2023-10-23 RX ADMIN — ACETAMINOPHEN 650 MG: 325 TABLET, FILM COATED ORAL at 05:04

## 2023-10-23 ASSESSMENT — ACTIVITIES OF DAILY LIVING (ADL)
ADLS_ACUITY_SCORE: 22
ADLS_ACUITY_SCORE: 26
ADLS_ACUITY_SCORE: 26
ADLS_ACUITY_SCORE: 22

## 2023-10-23 NOTE — PROGRESS NOTES
THORACIC & FOREGUT SURGERY    S:  Patient hemoglobin stable this morning, no fevers or chills. Patient tolerating diet, went for x2 walks yesterday, having bowel movements, making urine. States that she would like to go to rehab facility to regain strength.     O:  Vitals:    10/23/23 0540 10/23/23 0550 10/23/23 0600 10/23/23 0717   BP:    118/61   BP Location:    Right arm   Cuff Size:       Pulse:    74   Resp:    12   Temp:    98.2  F (36.8  C)   TempSrc:    Oral   SpO2: 96% 93% 93%    Weight:       Height:         A&Ox3, NAD  Breathing non-labored on room air, minimal drainage from incisions  RRR  Soft, appropriately tender  Distal extremities warm.  No calf tenderness.    A/P: Talya Walter is a 78 year old female with PMHx of vWF now POD#5 s/p Lysis of adhesions >45 minutes, Robot-assisted laparoscopic hiatal hernia repair, left chest tube placement, Esophagoscopy, gastroscopy, duodenoscopy (EGD), combined, percutaneous endoscopic gastrostomy tube placement for Hiatal hernia with GERD. Chest tube removed 10/19. Pt having drop in her hgb requiring pRBC. CT PE & A/P completed to evaluate for source of bleeding. CT PE negative, CT A/P showing new right rectus sheath hematoma with questionable extravasation. IR consulted 10/19, no indication for any intervention, continue to trend hemoglobin. Heme consulted, recommending Aminocaproic acid.  Hemoglobin is stable 3 days in a row. Having BM and tolerating soft diet, up out of bed and walking around.     Pain/neuro : oxy, robaxan, tylenol   - PTA lexapro resumed   CV: monitor for arrhythmia or tachycardia  Resp: Stable on RA  - IS  GI: G-tube clamped, soft diet   - Senna and miralax  Hem: Heme consult, appreciate recs              - Hb 7.1 this morning - CBC daily  PPx: Lovenox (HELD) and Protonix  Renal/FEN: IVF discontinued 10/22  Drains/lines/tubes with settings : G-tube (clamped)  PT/OT/SW: PT/OT- make sure patient is working with PT and walking at least 4  times a day  Dispo: Home vs. Rehab facility, will discuss with MYKEL Armstrong MD   PGY-1 Surgery

## 2023-10-23 NOTE — PROGRESS NOTES
V/S: VSS, afebrile. -120s. HR 60-90s.  Neuro: Pt is A&O x4. No c/o headache & lightheadedness. No c/o numbness & tingling, calls appropriately.  Resp: 2L NC. Sats > 93, no c/o SOB, has KEYES. Lung sounds clear & diminished bilaterally in bases. Pt reports prn albuterol effective for wheezing relief.  Cardiac: No tele orders, HR SR. No c/o chest pain & palpitations.  GI/: No BG checks. Easy to chew (level 7) diet, tolerating well. No FR. No c/o n/v/d. Voiding adequately via bedside. Last BM 10/22/2023.  Skin: Skin bruised on abdomen (large hematoma, team aware), site marked. No new deficits noted.  Pain: C/o abdominal pain, given x1 tylenol.  Activity: SBA in room & in hallways, pt has steady gait.  Electrolytes: No replacement orders, recheck in am.  LDAs: LUQ PEG tube open to gravity drainage bag, WDL. X7 laparoscopic sites approximated & WDL (see flowsheets), abdominal binder in use. R single lumen chest port heparin locked & WDL. R PIV SL & WDL. L PIV SL & WDL.     Pt wants to speak with social work regarding discharge plans (Reports that she wants to go to a rehab facility after discharge).    Plan of care ongoing.  Patient currently resting in bed with call light in reach.

## 2023-10-23 NOTE — CONSULTS
Brief Care Management Note    Length of Stay (days): 5    Expected Discharge Date: 10/24/2023     Concerns to be Addressed: Discharge planning  Patient plan of care discussed at interdisciplinary rounds: Yes    Anticipated Discharge Disposition: Home  Anticipated Discharge Services:  None  Anticipated Discharge DME:  None    Patient/family educated on Medicare website which has current facility and service quality ratings:  NA  Education Provided on the Discharge Plan: Yes  Patient/Family in Agreement with the Plan: Yes    Referrals Placed by CM/SW: NA  Private pay costs discussed: Not applicable    Additional Information:  Per chart review, patient was hesitant about discharging to home and was requesting rehab placement for deconditioning. OT worked w/patient this morning and cleared her for home w/assist, patient is agreeable to this. No discharge needs noted, SW consult acknowledged.     Care coordination will continue to follow.     Ernestine Polk, RNCC, BSN    [facility]    [address]  [address] [place] [id number]    [email or web address]  [email or web address]    Office: [phone] Pager: [phone]

## 2023-10-23 NOTE — PLAN OF CARE
6B Defer Physical Therapy - Per chart review and discussion with Occupational Therapy, Pt ambulating at or near her functional baseline, with no concerns during performance of stairs during OT. Plan for OT to continue to follow to address any functional deficits as needed. Pt with no skilled inpatient PT needs, Will complete consult and defer evaluation, please reconsult as appropriate if patient has decline in functional mobility requiring further skilled inpatient PT needs. Defer Discharge recommendations to Occupational Therapy.

## 2023-10-23 NOTE — PLAN OF CARE
Neuro: A&Ox4. PERRL. CHO. Afebrile  Cardiac: No tele. RRR. MAPs > 65 OVSS.   Respiratory: Sating > 92% on RA.  GI/: Adequate urine output. Smear BM x1. Bowel reg in place.  Diet/appetite: Tolerating level 7 diet. Fair appetite  Activity:  Independent, up to chair and bedside commode  Pain: At acceptable level on current regimen.   Skin: No new deficits noted.  LDA's: 2x PIV SL. Venting G-tube clamped. Port hep locked REA Chest    Plan: Probable discharge in AM if Hgb stable. REINFORCE EDUCATION ON G-TUBE MANAGEMENT. Continue with POC. Notify primary team with changes.

## 2023-10-24 VITALS
HEIGHT: 66 IN | SYSTOLIC BLOOD PRESSURE: 143 MMHG | BODY MASS INDEX: 30.12 KG/M2 | RESPIRATION RATE: 18 BRPM | HEART RATE: 85 BPM | WEIGHT: 187.39 LBS | OXYGEN SATURATION: 94 % | TEMPERATURE: 98.1 F | DIASTOLIC BLOOD PRESSURE: 106 MMHG

## 2023-10-24 LAB
ABO/RH(D): NORMAL
ALBUMIN SERPL BCG-MCNC: 3.1 G/DL (ref 3.5–5.2)
ALP SERPL-CCNC: 331 U/L (ref 35–104)
ALT SERPL W P-5'-P-CCNC: 144 U/L (ref 0–50)
ANION GAP SERPL CALCULATED.3IONS-SCNC: 8 MMOL/L (ref 7–15)
ANTIBODY SCREEN: NEGATIVE
AST SERPL W P-5'-P-CCNC: 118 U/L (ref 0–45)
BILIRUB SERPL-MCNC: 0.5 MG/DL
BLD PROD TYP BPU: NORMAL
BLOOD COMPONENT TYPE: NORMAL
BUN SERPL-MCNC: 8.8 MG/DL (ref 8–23)
CALCIUM SERPL-MCNC: 8.9 MG/DL (ref 8.8–10.2)
CHLORIDE SERPL-SCNC: 103 MMOL/L (ref 98–107)
CODING SYSTEM: NORMAL
CREAT SERPL-MCNC: 0.44 MG/DL (ref 0.51–0.95)
CROSSMATCH: NORMAL
DEPRECATED HCO3 PLAS-SCNC: 26 MMOL/L (ref 22–29)
EGFRCR SERPLBLD CKD-EPI 2021: >90 ML/MIN/1.73M2
ERYTHROCYTE [DISTWIDTH] IN BLOOD BY AUTOMATED COUNT: 20.3 % (ref 10–15)
FOLATE SERPL-MCNC: 24.1 NG/ML (ref 4.6–34.8)
GLUCOSE SERPL-MCNC: 112 MG/DL (ref 70–99)
HCT VFR BLD AUTO: 22.1 % (ref 35–47)
HGB BLD-MCNC: 7.2 G/DL (ref 11.7–15.7)
ISSUE DATE AND TIME: NORMAL
MCH RBC QN AUTO: 34.1 PG (ref 26.5–33)
MCHC RBC AUTO-ENTMCNC: 32.6 G/DL (ref 31.5–36.5)
MCV RBC AUTO: 105 FL (ref 78–100)
PLATELET # BLD AUTO: 263 10E3/UL (ref 150–450)
POTASSIUM SERPL-SCNC: 4.2 MMOL/L (ref 3.4–5.3)
PROT SERPL-MCNC: 5.3 G/DL (ref 6.4–8.3)
RBC # BLD AUTO: 2.11 10E6/UL (ref 3.8–5.2)
SODIUM SERPL-SCNC: 137 MMOL/L (ref 135–145)
SPECIMEN EXPIRATION DATE: NORMAL
UNIT ABO/RH: NORMAL
UNIT NUMBER: NORMAL
UNIT STATUS: NORMAL
UNIT TYPE ISBT: 5100
VON WILLEBRAND EVAL PPP-IMP: NORMAL
VWF MULTIMERS PPP QL: NORMAL
WBC # BLD AUTO: 6.7 10E3/UL (ref 4–11)

## 2023-10-24 PROCEDURE — 250N000013 HC RX MED GY IP 250 OP 250 PS 637: Performed by: STUDENT IN AN ORGANIZED HEALTH CARE EDUCATION/TRAINING PROGRAM

## 2023-10-24 PROCEDURE — 258N000003 HC RX IP 258 OP 636: Performed by: THORACIC SURGERY (CARDIOTHORACIC VASCULAR SURGERY)

## 2023-10-24 PROCEDURE — 82746 ASSAY OF FOLIC ACID SERUM: CPT | Performed by: PHYSICIAN ASSISTANT

## 2023-10-24 PROCEDURE — P9016 RBC LEUKOCYTES REDUCED: HCPCS | Performed by: PHYSICIAN ASSISTANT

## 2023-10-24 PROCEDURE — 86901 BLOOD TYPING SEROLOGIC RH(D): CPT | Performed by: THORACIC SURGERY (CARDIOTHORACIC VASCULAR SURGERY)

## 2023-10-24 PROCEDURE — 86923 COMPATIBILITY TEST ELECTRIC: CPT | Performed by: PHYSICIAN ASSISTANT

## 2023-10-24 PROCEDURE — 250N000011 HC RX IP 250 OP 636: Mod: JZ | Performed by: STUDENT IN AN ORGANIZED HEALTH CARE EDUCATION/TRAINING PROGRAM

## 2023-10-24 PROCEDURE — 250N000011 HC RX IP 250 OP 636: Performed by: THORACIC SURGERY (CARDIOTHORACIC VASCULAR SURGERY)

## 2023-10-24 PROCEDURE — 80053 COMPREHEN METABOLIC PANEL: CPT | Performed by: STUDENT IN AN ORGANIZED HEALTH CARE EDUCATION/TRAINING PROGRAM

## 2023-10-24 PROCEDURE — 85027 COMPLETE CBC AUTOMATED: CPT | Performed by: STUDENT IN AN ORGANIZED HEALTH CARE EDUCATION/TRAINING PROGRAM

## 2023-10-24 RX ORDER — AMOXICILLIN 250 MG
1 CAPSULE ORAL 2 TIMES DAILY
Qty: 14 TABLET | Refills: 0 | Status: SHIPPED | OUTPATIENT
Start: 2023-10-24

## 2023-10-24 RX ORDER — OXYCODONE HYDROCHLORIDE 5 MG/1
5 TABLET ORAL EVERY 4 HOURS PRN
Qty: 40 TABLET | Refills: 0 | Status: SHIPPED | OUTPATIENT
Start: 2023-10-24

## 2023-10-24 RX ORDER — POLYETHYLENE GLYCOL 3350 17 G/17G
17 POWDER, FOR SOLUTION ORAL DAILY
Qty: 7 PACKET | Refills: 0 | Status: SHIPPED | OUTPATIENT
Start: 2023-10-25

## 2023-10-24 RX ORDER — ACETAMINOPHEN 325 MG/1
650 TABLET ORAL EVERY 4 HOURS PRN
COMMUNITY
Start: 2023-10-24

## 2023-10-24 RX ORDER — METHOCARBAMOL 500 MG/1
500 TABLET, FILM COATED ORAL EVERY 6 HOURS PRN
Qty: 40 TABLET | Refills: 0 | Status: SHIPPED | OUTPATIENT
Start: 2023-10-24

## 2023-10-24 RX ADMIN — Medication 5 ML: at 03:54

## 2023-10-24 RX ADMIN — Medication 5 ML: at 17:42

## 2023-10-24 RX ADMIN — ACETAMINOPHEN 650 MG: 325 TABLET, FILM COATED ORAL at 09:44

## 2023-10-24 RX ADMIN — CLOTRIMAZOLE: 1 SOLUTION TOPICAL at 07:45

## 2023-10-24 RX ADMIN — ESCITALOPRAM OXALATE 10 MG: 10 TABLET ORAL at 07:37

## 2023-10-24 RX ADMIN — OXYCODONE HYDROCHLORIDE 5 MG: 5 TABLET ORAL at 00:17

## 2023-10-24 RX ADMIN — SODIUM CHLORIDE: 900 INJECTION, SOLUTION INTRAVENOUS at 00:08

## 2023-10-24 RX ADMIN — SODIUM CHLORIDE: 900 INJECTION, SOLUTION INTRAVENOUS at 07:39

## 2023-10-24 RX ADMIN — PANTOPRAZOLE SODIUM 40 MG: 40 TABLET, DELAYED RELEASE ORAL at 07:37

## 2023-10-24 RX ADMIN — POLYETHYLENE GLYCOL 3350 17 G: 17 POWDER, FOR SOLUTION ORAL at 07:35

## 2023-10-24 RX ADMIN — SENNOSIDES AND DOCUSATE SODIUM 1 TABLET: 50; 8.6 TABLET ORAL at 07:37

## 2023-10-24 RX ADMIN — CLOTRIMAZOLE: 1 SOLUTION TOPICAL at 14:20

## 2023-10-24 ASSESSMENT — ACTIVITIES OF DAILY LIVING (ADL)
ADLS_ACUITY_SCORE: 22
ADLS_ACUITY_SCORE: 20
ADLS_ACUITY_SCORE: 22

## 2023-10-24 NOTE — PLAN OF CARE
Shift 3222-7256    Neuro: A&Ox4. Hard of hearing.  Cardiac: No tele, VSS.Afebrile.   Respiratory: 2L nasal cannula while asleep, RA while awake.  GI/: Adequate urine output via commode. BM x1 overnight. G tube in place (clamped). Denies any nausea.  Diet/appetite: Tolerating level 7 diet.  Activity:  Independent  Pain: At acceptable level on current regimen. PRN oxycodone 5mg given x1 with relief.  Skin: No new deficits noted.  LDA's: PIVx2, R chest port heparin locked. G tube clamped.    Plan: Continue with POC. Notify primary team with changes.

## 2023-10-24 NOTE — PROGRESS NOTES
"Care Management Discharge Note    Discharge Date: 10/24/2023       Discharge Disposition:  Home    Discharge Services:  Pt provided with OOP transportation options and [employer] application/information    Discharge DME:  None    Discharge Transportation: family or friend will provide - pt checking with pt daughter (Hannah) to see if she can pick her up this afternoon. If not, pt feels comfortable ordering private pay taxi for . Companies and phone numbers provided.     Private pay costs discussed: transportation costs    Does the patient's insurance plan have a 3 day qualifying hospital stay waiver?  Yes    PAS Confirmation Code: N/A   Patient/family educated on Medicare website which has current facility and service quality ratings:  N/A    Education Provided on the Discharge Plan:  Yes  Persons Notified of Discharge Plans: Pt, Bedside RN  Patient/Family in Agreement with the Plan:  Yes    Handoff Referral Completed: Yes    Additional Information:  SW notified by Bedside RN that pt is requesting to speak with SW re: transportation resources to assist pt with getting to OP appointments.     SW met with pt at bedside to discuss transportation resources. Pt reported that she would like to see Hematology prior to discharge as she noted that the team had told her that they were recommending she receive transfusion and pt reported that some concerns were raised about it being safe for pt to go home. SW explained that in order for pt to go to TCU that she would need to have a skilled need and unfortunately did not have one at this time as PT/OT are recommending home with assist. Pt agreeable to going home. SW also provided pt with list of OOP transportation resources. Pt specifically asked about [employer] and SW provided education on the service and application process. Pt requested an application. Pt noted that pt's daughter (Hannah) might be able to pick her up but that pt did not want to be a \"burden\" to " pt's family. Pt noted that she felt comfortable taking a taxi home. SW noted that pt's transportation home was pt's choice but that pt could always ask pt family first to see if they could pick pt up from hospital first. Pt agreed to this.     SW later returned to pt's room with [employer] application. Pt reported that she had a message out to pt daughter to see if she could pick her up this afternoon. Pt also speaking with pt spouse upon SW arrival at bedside and pt's spouse plans to be at home this afternoon to assist pt as needed. Pt reported that she had no other questions or needs from SW at this time. Conversation with pt relayed to Bedside RN.     RED Laird   [facility]   6B SW Ph: [phone]

## 2023-10-24 NOTE — PLAN OF CARE
"/68 (BP Location: Right arm, Cuff Size: Adult Regular)   Pulse 80   Temp 98.1  F (36.7  C) (Oral)   Resp 16   Ht 1.676 m (5' 6\")   Wt 85 kg (187 lb 6.3 oz)   SpO2 98%   BMI 30.25 kg/m      Hours of Care: 2790-8755.    Neuro: AO x 4.   Vitals: VSS   Respiratory: WDL on RA.  Cardiac: WDL. No tele orders.    GI/: Voiding spontaneously.  Skin/Wounds: No new deficits noted.  Lines: PIV x 2, R chest port.  Diet: Level 7,  low fiber.  Activity: Up independently.  Pain: Denies.  Plan: to discharge after receiving one unit of RBCs.  Discharge paperwork completed.      Continue to monitor and follow POC    Benito Pan RN on 10/24/2023 at 2:36 PM    6B-Intermediate Care         "

## 2023-10-24 NOTE — DISCHARGE SUMMARY
NAME: Talya Walter   MRN: [id number]   : 1945     DATE OF ADMISSION: 10/18/2023     PRE/POSTOPERATIVE DIAGNOSES: Hiatal Hernia    PROCEDURES PERFORMED:   Robotic-assisted redo laparoscopic repair of hiatal hernia  EGD  Gastrostomy  Left  chest tube insertion  Lysis of adhesions for greater than 45 minutes    PATHOLOGY RESULTS: Final pending at time of discharge     CODING ADDENDUM:  Rectus sheath hematoma due to procedure and underlying Type 2A Von Willebrand's disease     CULTURE RESULTS: None     INTRAOPERATIVE COMPLICATIONS: None     POSTOPERATIVE MEDICAL ISSUES: None     DRAINS/TUBES PRESENT AT DISCHARGE: dressing changes and G tube    DATE OF DISCHARGE:  10/24/2023    HOSPITAL COURSE: Talya Walter is a 78 year old female who on 10/18/2023 underwent the above-named procedures.  She tolerated the operation well and postoperatively was transferred to the general post-surgical unit.  Her course was complicated by postoperative anemia requiring multiple infusions. A rectus sheath hematoma was identified but no intervention warranted. Hematology was involved during the entirety of her hospital course due to her VWD history.  Prior to discharge her Hgb had been stable for >3days, her pain was controlled well, she was able to perform ADLs and ambulate independently without difficulty, and had full return of bowel and bladder function.  On 10/24, she was discharged home in stable condition with Gtube in place.    DISCHARGE EXAM:   A&O, NAD  Resp non-labored  Distal extremities warm    Incisions CDI  PEG tube without concerns      DISCHARGE INSTRUCTIONS:  Discharge Procedure Orders   Reason for your hospital stay   Order Comments: Surgery     Activity   Order Comments: Your activity upon discharge: activity as tolerated     Order Specific Question Answer Comments   Is discharge order? Yes      Tubes and drains   Order Comments: PEG TUBE INSTRUCTIONS: (RN Please dispense PEG tube supplies. Syringes, drain  bag, basins, gauze, etc...)    Venting:  -You should vent or temporarily put your tube to gravity bag (or basin) drainage if you experience nausea or bloating.  This is important as it will help to protect the hernia repair.   If you are uncertain how to do this, please ask your nurse for instruction.    Skin care:  -Change the gauze dressing around your PEG tube daily.  -Check for redness and swelling in the area where the tube goes into your skin.    -Keep the skin around your tube dry and with a split gauze under the flange. If the hole where the tube enters your body is draining fluid, you may need to put barrier cream or antibiotic cream on the skin around your tube after you are done cleaning it. Cover the area with bandages, and call your caregiver.   -If there are no stitches holding your PEG tube in place on your skin, gently turn the tube. This may decrease pressure on your skin, and help prevent an infection. Ask your caregiver if you should turn your PEG tube, and how to do it correctly.     Flushes:  -Flush your feeding tube with 30cc of water twice daily to ensure it does not become plugged  -If administering medications or tube feedings via your tube, make sure to flush with water immediately after use to prevent the tube from plugging     Discharge Instructions   Order Comments: THORACIC SURGERY DISCHARGE INSTRUCTIONS    DIET: Soft mechanical diet at time of discharge.  Follow post Nissen diet recommendations as discussed with dietician and detailed in provided handout    Take stool softeners as prescribed at discharge (Miralax, docusate, and/or senna).  CALL THE THORACIC SURGERY TEAM IF YOU HAVE NOT HAD A BOWEL MOVEMENT BY 48HRS AFTER DISCHARGE.     If your plans upon discharge include prolonged periods of sitting (i.e a lengthy car or plane ride), it is highly beneficial to get up and walk at least once per hour to help prevent swelling and blood clots.     You may remove chest tube dressing 48  "hours after tube removal and bandage the site at your own discretion thereafter.  Small amounts of leakage are normal for 2-3 days after removal.  Feel free to call with questions.    You may get incision wet 2 days after operation. Do not submerge, soak, or scrub incision or swim until seen in follow-up.    Take incentive spirometer home for continued frequent use    Activity as tolerated, no strenous activity until seen in follow-up, no lifting greater than 10 pounds for the next 8-10 weeks.    Call for fever greater than 101.5, chills, increased size of incision, red skin around incision, vision changes, muscle strength changes, sensation changes, shortness of breath, or other concerns.    No driving while taking narcotic pain medication.    Transition to ibuprofen or tylenol/acetaminophen for pain control. Do not take tylenol/acetaminophen and acetaminophen containing narcotic (e.g., percocet or vicodin) at the same time. If you have known ulcer problems, or kidney trouble (elevated creatinine) do not take the ibuprofen.    In emergencies, call 911    For other Questions or Concerns;   A.) During weekday working hours (Monday through Friday 8am to 4:30pm)   call [phone] and ask to speak to a clinical nurse specialist.     B.) At nights (after 4:30pm), on weekends, or if urgent call [phone] and   tell the  \"I would like to page job code [phone], the thoracic surgery   fellow on call, please.\"     Adult [facility] Follow-up and recommended labs and tests   Order Comments: 1.) Follow up with primary care physician, Bear Goldberg, in 1-2 weeks.  2.) Follow up with Hematology per their recommendations.  3.) Follow up with Dr. Jaard Cheung in Thoracic Surgery clinic on 11/13/2023, prior to which an UGI should be performed (details below).     XR ESOPHOGRAM W UPPER GI  1:45 PM  (45 min.)  [email or web address]  [facility]  Imaging    RETURN CCSL  3:00 PM  (30 min.)  Jarad Cheung MD  [facility] " [facility] Cancer  Center [place]      Appointments on [place] and/or [facility] (with [facility] or [facility] provider or service). Call [phone] if you haven't heard regarding these appointments within 7 days of discharge.     Diet   Order Comments: Follow this diet upon discharge: Orders Placed This Encounter      Easy to Chew Diet (level 7)     Order Specific Question Answer Comments   Is discharge order? Yes        DISCHARGE MEDICATIONS:   Current Discharge Medication List        START taking these medications    Details   acetaminophen (TYLENOL) 325 MG tablet Take 2 tablets (650 mg) by mouth every 4 hours as needed for other (For optimal non-opioid multimodal pain management to improve pain control.)    Associated Diagnoses: Hiatal hernia; Von Willebrand's disease (H)      methocarbamol (ROBAXIN) 500 MG tablet Take 1 tablet (500 mg) by mouth every 6 hours as needed for muscle spasms  Qty: 40 tablet, Refills: 0    Associated Diagnoses: Hiatal hernia; Von Willebrand's disease (H)      oxyCODONE (ROXICODONE) 5 MG tablet Take 1 tablet (5 mg) by mouth every 4 hours as needed for moderate pain  Qty: 40 tablet, Refills: 0    Associated Diagnoses: Hiatal hernia; Von Willebrand's disease (H)      polyethylene glycol (MIRALAX) 17 g packet Take 17 g by mouth daily  Qty: 7 packet, Refills: 0    Associated Diagnoses: Hiatal hernia; Von Willebrand's disease (H)      senna-docusate (SENOKOT-S/PERICOLACE) 8.6-50 MG tablet Take 1 tablet by mouth 2 times daily  Qty: 14 tablet, Refills: 0    Associated Diagnoses: Hiatal hernia; Von Willebrand's disease (H)           CONTINUE these medications which have NOT CHANGED    Details   clotrimazole (LOTRIMIN) 1 % external solution Instill 5 drops into each ear 3 (three) times daily.      escitalopram (LEXAPRO) 10 MG tablet Take 10 mg by mouth every morning      Ferrous Sulfate (IRON) 28 MG TABS Take 1 tablet by mouth every morning      FOLIC ACID PO Take 1 tablet by mouth daily       glucosamine-chondroitin 500-400 MG CAPS per capsule Take 1 capsule by mouth every morning      omeprazole (PRILOSEC) 40 MG DR capsule Take 40 mg by mouth 2 times daily      sucralfate (CARAFATE) 1 GM tablet Take 1 g by mouth 4 times daily      triamcinolone (KENALOG) 0.1 % external ointment Apply topically daily      UNABLE TO FIND Take 1 capsule by mouth every morning MEDICATION NAME: Stool softener      vitamin C (ASCORBIC ACID) 500 MG tablet Take 1 tablet by mouth every morning      Vitamin D3 (CHOLECALCIFEROL) 25 mcg (1000 units) tablet Take 1 tablet by mouth every morning

## 2023-10-24 NOTE — PLAN OF CARE
DISCHARGE                         No discharge date for patient encounter.  ----------------------------------------------------------------------------  Discharged to: Home  Via: private transportation  Accompanied by: Daughter  Discharge Instructions: Regular diet, encourage activity, medications, follow up appointments, when to call the MD, aftercare instructions.  Prescriptions: To be filled by [facility] pharmacy; medication list reviewed & sent with pt  Follow Up Appointments: arranged; information given  Belongings: All sent with pt  IV: d/c'd  Telemetry: d/c'd  Pt exhibits understanding of above discharge instructions; all questions answered.    Discharge Paperwork: Signed, copied, and sent home with patient.

## 2023-10-25 ENCOUNTER — PATIENT OUTREACH (OUTPATIENT)
Dept: CARE COORDINATION | Facility: CLINIC | Age: 78
End: 2023-10-25
Payer: MEDICARE

## 2023-10-25 ENCOUNTER — NURSE TRIAGE (OUTPATIENT)
Dept: ONCOLOGY | Facility: CLINIC | Age: 78
End: 2023-10-25
Payer: MEDICARE

## 2023-10-25 NOTE — PLAN OF CARE
Occupational Therapy Discharge Summary    Reason for therapy discharge:    Discharged to home.    Progress towards therapy goal(s). See goals on Care Plan in [facility] electronic health record for goal details.  Goals partially met.  Barriers to achieving goals:   discharge from facility.    Therapy recommendation(s):    No further therapy is recommended.

## 2023-10-25 NOTE — PROGRESS NOTES
Clinic Care Coordination Contact  [facility]: Post-Discharge Note  SITUATION                                                      Admission:    Admission Date: 10/18/23   Reason for Admission: Hiatal hernia  Discharge:   Discharge Date: 10/24/23  Discharge Diagnosis: Hiatal hernia    BACKGROUND                                                      Talya Walter is a 78 year old female who on 10/18/2023 underwent the above-named procedures.  She tolerated the operation well and postoperatively was transferred to the general post-surgical unit.  Her course was complicated by postoperative anemia requiring multiple infusions. A rectus sheath hematoma was identified but now intervention warranted. Hematology was involved during the entirety of her hospital course due to her VWD history.  Prior to discharge her Hgb had been stable for >3days, her pain was controlled well, she was able to perform ADLs and ambulate independently without difficulty, and had full return of bowel and bladder function.  On 10/24, she was discharged home in stable condition with Gtube in place.       ASSESSMENT           Discharge Assessment  How are you doing now that you are home?: Spoke with patient who shares that she is doing well. Does have one quesiton about some bandages that she has on her wrists and is wondering if she can take these off or what instructions are as there is nothing in AVS.Writer lets patient know that they are not a nurse, but, will call triage and have a nurse call her back. Patient thanks writer for the help with this.  How are your symptoms? (Red Flag symptoms escalate to triage hotline per guidelines): Improved  Do you feel your condition is stable enough to be safe at home until your provider visit?: Yes  Does the patient have their discharge instructions? : Yes  Does the patient have questions regarding their discharge instructions? : No  Were you started on any new medications or were there changes to  any of your previous medications? : Yes  Does the patient have all of their medications?: Yes  Do you have questions regarding any of your medications? : No  Do you have all of your needed medical supplies or equipment (DME)?  (i.e. oxygen tank, CPAP, cane, etc.): Yes  Discharge follow-up appointment scheduled within 14 calendar days? : Yes  Discharge Follow Up Appointment Date: 11/13/23  Discharge Follow Up Appointment Scheduled with?: Specialty Care Provider    Post-op ([facility]W CTA Only)  If the patient had a surgery or procedure, do they have any questions for a nurse?: No    Post-op (Clinicians Only)  Did the patient have surgery or a procedure: Yes  Incision: closed  Drainage: No  Fever: No  Chills: No  Redness: No        PLAN                                                      Outpatient Plan:  1.) Follow up with primary care physician, Bear Goldberg, in 1-2 weeks.  2.) Follow up with Hematology per their recommendations.  3.) Follow up with Dr. Jarad Cheung in Thoracic Surgery clinic on 11/13/2023, prior to which an UGI should be performed (details below).      XR ESOPHOGRAM W UPPER GI  1:45 PM  (45 min.)  SHXR5  [facility]  Imaging     RETURN CCSL  3:00 PM  (30 min.)  Jarad Cheung MD  [facility] Cancer  Center [place]        Appointments on [place] and/or [facility] (with [facility] or [facility] provider or service). Call [phone] if you haven't heard regarding these appointments within 7 days of discharge.    Future Appointments   Date Time Provider Department Center   11/13/2023  2:00 PM SHXRTono [facility] SE[facility]O   11/13/2023  3:00 PM Jarad Cheung MD [facility]         For any urgent concerns, please contact our 24 hour nurse triage line: [phone] ([phone])         IMELDA Benavides

## 2023-10-25 NOTE — TELEPHONE ENCOUNTER
Has pressure bandage at wrists where IV's were inserted during surgery.   She had significant bleeding when they took out the IV's. IT soaked her gown the chair and the floor.   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   Left wrist has hematoma on it.   She things the pressure dressings need to come off soon.   Hematology was getting her set up for a lab visit to have someone remove the dressings but she does not know where that is in process.

## 2023-10-26 ENCOUNTER — TELEPHONE (OUTPATIENT)
Dept: HEMATOLOGY | Facility: CLINIC | Age: 78
End: 2023-10-26
Payer: MEDICARE

## 2023-10-26 ENCOUNTER — PATIENT OUTREACH (OUTPATIENT)
Dept: ONCOLOGY | Facility: CLINIC | Age: 78
End: 2023-10-26
Payer: MEDICARE

## 2023-10-26 DIAGNOSIS — D64.9 ANEMIA, UNSPECIFIED TYPE: Primary | ICD-10-CM

## 2023-10-26 LAB
BASE EXCESS BLDA CALC-SCNC: -4.9 MMOL/L (ref -9.6–2)
CA-I BLD-MCNC: 3.1 MG/DL (ref 4.4–5.2)
GLUCOSE BLD-MCNC: 180 MG/DL (ref 70–99)
HCO3 BLDA-SCNC: 22 MMOL/L (ref 21–28)
HGB BLD-MCNC: 9.4 G/DL (ref 11.7–15.7)
LACTATE BLD-SCNC: 2.8 MMOL/L
O2/TOTAL GAS SETTING VFR VENT: 50 %
PCO2 BLDA: 48 MM HG (ref 35–45)
PH BLDA: 7.27 [PH] (ref 7.35–7.45)
PO2 BLDA: 80 MM HG (ref 80–105)
POTASSIUM BLD-SCNC: 4.3 MMOL/L (ref 3.5–5)
SODIUM BLD-SCNC: 141 MMOL/L (ref 135–145)

## 2023-10-26 NOTE — PROGRESS NOTES
"[facility]: Post-Discharge Note  SITUATION                                                      Admission:    Admission Date: 10/18/23   Reason for Admission: Hiatal Hernia  Discharge:   Discharge Date: 10/24/23  Discharge Diagnosis: Robotic-assisted redo laparoscopic repair of hiatal hernia    BACKGROUND                                                      Per hospital discharge summary and inpatient provider notes.  \"HOSPITAL COURSE: Talya Walter is a 78 year old female who on 10/18/2023 underwent the above-named procedures.  She tolerated the operation well and postoperatively was transferred to the general post-surgical unit.  Her course was complicated by postoperative anemia requiring multiple infusions. A rectus sheath hematoma was identified but no intervention warranted. Hematology was involved during the entirety of her hospital course due to her VWD history.  Prior to discharge her Hgb had been stable for >3days, her pain was controlled well, she was able to perform ADLs and ambulate independently without difficulty, and had full return of bowel and bladder function.  On 10/24, she was discharged home in stable condition with Gtube in place. \"    ASSESSMENT           Discharge Assessment  How are you doing now that you are home?: well I took a turn for the better today, I feel like I'm ahead of schedule; showered, washed her hair  How are your symptoms? (Red Flag symptoms escalate to triage hotline per guidelines): Improved  Do you feel your condition is stable enough to be safe at home until your provider visit?: Yes  Does the patient have their discharge instructions? : Yes  Does the patient have questions regarding their discharge instructions? : No  Were you started on any new medications or were there changes to any of your previous medications? : No  Does the patient have all of their medications?: Yes  Do you have questions regarding any of your medications? : No  Do you have all of your " "needed medical supplies or equipment (DME)?  (i.e. oxygen tank, CPAP, cane, etc.): Yes  Discharge follow-up appointment scheduled within 14 calendar days? : Yes  Discharge Follow Up Appointment Date: 11/13/23  Discharge Follow Up Appointment Scheduled with?: Specialty Care Provider    Post-op ([facility] CTA Only)  If the patient had a surgery or procedure, do they have any questions for a nurse?: No    Post-op (Clinicians Only)  Did the patient have surgery or a procedure: Yes  Incision: closed  Drainage: No  Fever: No  Chills: No  Redness: No  Warmth: No  Swelling: No  Incision site pain: No  Eating & Drinking: eating and drinking without complaints/concerns  PO Intake: full liquids  Bowel Function: normal  Date of last BM: 10/26/23  Urinary Status: voiding without complaint/concerns        PLAN                                                      Outpatient Plan:  feeling well. Taking oxy PRN and tylenol. She is walking and moving around well. She is sleeping well. BM \"20 minutes ago\"     Future Appointments   Date Time Provider Department Center   11/2/2023  2:15 PM CS LAB [facility]R CS   11/13/2023  2:00 PM [facility]   11/13/2023  3:00 PM Jarad Cheung MD [facility]         For any urgent concerns, please contact our 24 hour clinic line:   [place]: [phone]       Arabella Stuart, RN, BSN  Specialty Care Coordinator  [facility] Cancer [facility]  [phone]                "

## 2023-10-26 NOTE — TELEPHONE ENCOUNTER
[id number]  Talya Walter  78 year old female  CBCD Diagnosis: VWD  CBCD Provider: Osmar    Called patient. She decided to take of pressure dressing from IV site this morning. She had no bleeding when she remove it. She is feeling much better today. She is having no bleeding from incision site. Her hematoma has completely resolved, and slight bruising is improving to greenish/yellow color.    We scheduled a follow up blood draw on Nov 1st, but she is to call me if she has any bleeding issues to do this sooner. She will do a video visit with Dr. Prasad on 11/16/23.  Maite Meza, MSN, RN, PHN -Nurse Clinician, [facility] [phone]

## 2023-11-02 ENCOUNTER — LAB (OUTPATIENT)
Dept: LAB | Facility: CLINIC | Age: 78
End: 2023-11-02
Payer: MEDICARE

## 2023-11-02 DIAGNOSIS — D64.9 ANEMIA, UNSPECIFIED TYPE: ICD-10-CM

## 2023-11-02 LAB
BASOPHILS # BLD AUTO: 0 10E3/UL (ref 0–0.2)
BASOPHILS NFR BLD AUTO: 1 %
EOSINOPHIL # BLD AUTO: 0.1 10E3/UL (ref 0–0.7)
EOSINOPHIL NFR BLD AUTO: 1 %
ERYTHROCYTE [DISTWIDTH] IN BLOOD BY AUTOMATED COUNT: 18.8 % (ref 10–15)
FERRITIN SERPL-MCNC: 626 NG/ML (ref 11–328)
FOLATE SERPL-MCNC: 31.9 NG/ML (ref 4.6–34.8)
HCT VFR BLD AUTO: 31.9 % (ref 35–47)
HGB BLD-MCNC: 10 G/DL (ref 11.7–15.7)
IMM GRANULOCYTES # BLD: 0.2 10E3/UL
IMM GRANULOCYTES NFR BLD: 3 %
LYMPHOCYTES # BLD AUTO: 1.7 10E3/UL (ref 0.8–5.3)
LYMPHOCYTES NFR BLD AUTO: 20 %
MCH RBC QN AUTO: 32.6 PG (ref 26.5–33)
MCHC RBC AUTO-ENTMCNC: 31.3 G/DL (ref 31.5–36.5)
MCV RBC AUTO: 104 FL (ref 78–100)
MONOCYTES # BLD AUTO: 0.7 10E3/UL (ref 0–1.3)
MONOCYTES NFR BLD AUTO: 8 %
NEUTROPHILS # BLD AUTO: 5.9 10E3/UL (ref 1.6–8.3)
NEUTROPHILS NFR BLD AUTO: 68 %
PLATELET # BLD AUTO: 384 10E3/UL (ref 150–450)
RBC # BLD AUTO: 3.07 10E6/UL (ref 3.8–5.2)
VIT B12 SERPL-MCNC: 1041 PG/ML (ref 232–1245)
WBC # BLD AUTO: 8.6 10E3/UL (ref 4–11)

## 2023-11-02 PROCEDURE — 85025 COMPLETE CBC W/AUTO DIFF WBC: CPT

## 2023-11-02 PROCEDURE — 36415 COLL VENOUS BLD VENIPUNCTURE: CPT

## 2023-11-02 PROCEDURE — 82607 VITAMIN B-12: CPT

## 2023-11-02 PROCEDURE — 82728 ASSAY OF FERRITIN: CPT

## 2023-11-02 PROCEDURE — 82746 ASSAY OF FOLIC ACID SERUM: CPT

## 2023-11-05 ENCOUNTER — HEALTH MAINTENANCE LETTER (OUTPATIENT)
Age: 78
End: 2023-11-05

## 2023-11-06 ENCOUNTER — PATIENT OUTREACH (OUTPATIENT)
Dept: ONCOLOGY | Facility: CLINIC | Age: 78
End: 2023-11-06
Payer: MEDICARE

## 2023-11-06 NOTE — PROGRESS NOTES
[facility]: Cancer Care - RN CC Symptom Call          Situation                                                               Patient called to report painful, black, explosive diarrhea x 2 days                                               Assessment                                                      Presenting Problem: Diarrhea  Cancer Diagnosis: Hiatal Hernia, POD#19 Robotic-assisted redo laparoscopic repair of hiatal hernia   Cancer treatment & last dose:   Infusion given in last 28 days       None            Subjective/Objective: painful, black, explosive diarrhea x 2 days, belching.     Onset: sudden  Duration: 2 days    Description:       Consistency of stool: loose       Blood in stool: YES -        Number of loose stools in the past 24 hours: 12-15 overnight, has improved     Progression of Symptoms: improving    Accompanying signs and symptoms:        Fever: No        Nausea/Vomiting: No       Weight loss: No       Episodes of constipation: No        Dehydration concerns (i.e., dizziness, lightheadedness, decreased urination): YES - this morning - has resolved       Other: h/o rectus sheath hematoma, von willebrands dz    History:        Ill contacts: No       Recent use of antibiotics: No       History of C. Diff: No       Recent travels: No       Recent medication changes or new (RX or OTC): No       Allergies verified: Yes and No       Recent infusions: No       Oral chemotherapy: No    Precipitating or alleviating factors: None    Therapies tried and outcome: Stopped her laxative/stool softener        She is improving since she called this morning. She has gone 4 hours without going. She has no nausea. Was able to eat pudding and yogurt. She stopped taking her laxatives and stool softeners 2 days ago                    Intervention                                                      Homecare instructions:      -   stay hydrated    Plan                                                         -    Documented and routed to MD/DO, SARAI: Skye Prasad - concern that stools were black, draw hgb?     Patient verbalizes understanding of and agrees with plan? YES    Arabella Stuart, RN, BSN  Specialty Care Coordinator  [facility]  [phone]        Protocol used: Cancer Care Nursing Triage - Diarrhea

## 2023-11-07 ENCOUNTER — LAB (OUTPATIENT)
Dept: LAB | Facility: CLINIC | Age: 78
End: 2023-11-07
Payer: MEDICARE

## 2023-11-07 DIAGNOSIS — D68.00 VON WILLEBRAND'S DISEASE (H): ICD-10-CM

## 2023-11-07 DIAGNOSIS — D64.9 ANEMIA, UNSPECIFIED TYPE: Primary | ICD-10-CM

## 2023-11-07 DIAGNOSIS — D64.9 ANEMIA, UNSPECIFIED TYPE: ICD-10-CM

## 2023-11-07 LAB
ERYTHROCYTE [DISTWIDTH] IN BLOOD BY AUTOMATED COUNT: 18.2 % (ref 10–15)
FERRITIN SERPL-MCNC: 599 NG/ML (ref 11–328)
HCT VFR BLD AUTO: 26 % (ref 35–47)
HGB BLD-MCNC: 8.3 G/DL (ref 11.7–15.7)
MCH RBC QN AUTO: 32.4 PG (ref 26.5–33)
MCHC RBC AUTO-ENTMCNC: 31.9 G/DL (ref 31.5–36.5)
MCV RBC AUTO: 102 FL (ref 78–100)
PLATELET # BLD AUTO: 378 10E3/UL (ref 150–450)
RBC # BLD AUTO: 2.56 10E6/UL (ref 3.8–5.2)
WBC # BLD AUTO: 7.6 10E3/UL (ref 4–11)

## 2023-11-07 PROCEDURE — 85027 COMPLETE CBC AUTOMATED: CPT

## 2023-11-07 PROCEDURE — 36415 COLL VENOUS BLD VENIPUNCTURE: CPT

## 2023-11-07 PROCEDURE — 82728 ASSAY OF FERRITIN: CPT

## 2023-11-07 RX ORDER — HEPARIN SODIUM (PORCINE) LOCK FLUSH IV SOLN 100 UNIT/ML 100 UNIT/ML
5 SOLUTION INTRAVENOUS
OUTPATIENT
Start: 2023-11-14

## 2023-11-07 RX ORDER — HEPARIN SODIUM,PORCINE 10 UNIT/ML
5-20 VIAL (ML) INTRAVENOUS DAILY PRN
OUTPATIENT
Start: 2023-11-14

## 2023-11-07 RX ORDER — MEPERIDINE HYDROCHLORIDE 25 MG/ML
25 INJECTION INTRAMUSCULAR; INTRAVENOUS; SUBCUTANEOUS EVERY 30 MIN PRN
OUTPATIENT
Start: 2023-11-14

## 2023-11-07 RX ORDER — ALBUTEROL SULFATE 0.83 MG/ML
2.5 SOLUTION RESPIRATORY (INHALATION)
OUTPATIENT
Start: 2023-11-14

## 2023-11-07 RX ORDER — ALBUTEROL SULFATE 90 UG/1
1-2 AEROSOL, METERED RESPIRATORY (INHALATION)
Start: 2023-11-14

## 2023-11-07 RX ORDER — EPINEPHRINE 1 MG/ML
0.3 INJECTION, SOLUTION, CONCENTRATE INTRAVENOUS EVERY 5 MIN PRN
OUTPATIENT
Start: 2023-11-14

## 2023-11-07 RX ORDER — DIPHENHYDRAMINE HYDROCHLORIDE 50 MG/ML
50 INJECTION INTRAMUSCULAR; INTRAVENOUS
Start: 2023-11-14

## 2023-11-08 ENCOUNTER — PATIENT OUTREACH (OUTPATIENT)
Dept: ONCOLOGY | Facility: CLINIC | Age: 78
End: 2023-11-08
Payer: MEDICARE

## 2023-11-08 ENCOUNTER — ALLIED HEALTH/NURSE VISIT (OUTPATIENT)
Dept: HEMATOLOGY | Facility: CLINIC | Age: 78
End: 2023-11-08
Payer: MEDICARE

## 2023-11-08 VITALS
HEART RATE: 96 BPM | SYSTOLIC BLOOD PRESSURE: 144 MMHG | TEMPERATURE: 95.5 F | OXYGEN SATURATION: 98 % | DIASTOLIC BLOOD PRESSURE: 81 MMHG

## 2023-11-08 DIAGNOSIS — D68.00 VON WILLEBRAND'S DISEASE (H): Primary | ICD-10-CM

## 2023-11-08 PROCEDURE — 250N000015 HC RX FACTOR IP MED 636 OP 636: Performed by: INTERNAL MEDICINE

## 2023-11-08 PROCEDURE — 99207 PR NO CHARGE INJECTABLE MED/DRUG: CPT | Performed by: INTERNAL MEDICINE

## 2023-11-08 PROCEDURE — 96374 THER/PROPH/DIAG INJ IV PUSH: CPT

## 2023-11-08 RX ADMIN — VON WILLEBRAND FACTOR (RECOMBINANT) 3990 UNITS: KIT at 15:07

## 2023-11-08 NOTE — NURSING NOTE
[id number]  Talya Walter  78 year old female  CBCD Diagnosis: VWD  CBCD Provider: Dr. Osmar Babin presented today for an infusion of 3,990 units of Vonvendi today. IV access obtained in Left AC and medication was infused without issue. Talya was observed post infusion and left clinic in good condition. She endorses some orthostatic dizziness when going from sitting to standing prior to infusion. Vitals stable. Symptoms passed without intervention. Dr. Prasad informed. She will call the CBCD clinic if any bleeding concerns arise.      Lina Regalado RN, BSN, PCCN  Nurse Clinician    [facility]  [address] [place] [address], [address], [place] [address]   Office, direct: [phone]  Main office number: [phone]  Pronouns: She, her, hers

## 2023-11-08 NOTE — PROGRESS NOTES
[facility]: Cancer Care                                                                                          Patient had called yesterday inquiring about her medications. She wanted to know if she should restart her Prilosec and Carafate; she had stopped after being discharged from the hospital   Per Dr. Cheung, she should restart Prilosec BID and hold on carafate  LVM with instructions and to call back if needed.     Signature:  Arabella Stuart RN

## 2023-11-13 ENCOUNTER — ONCOLOGY VISIT (OUTPATIENT)
Dept: ONCOLOGY | Facility: CLINIC | Age: 78
End: 2023-11-13
Attending: THORACIC SURGERY (CARDIOTHORACIC VASCULAR SURGERY)
Payer: MEDICARE

## 2023-11-13 ENCOUNTER — HOSPITAL ENCOUNTER (OUTPATIENT)
Dept: GENERAL RADIOLOGY | Facility: CLINIC | Age: 78
Discharge: HOME | End: 2023-11-13
Attending: THORACIC SURGERY (CARDIOTHORACIC VASCULAR SURGERY) | Admitting: THORACIC SURGERY (CARDIOTHORACIC VASCULAR SURGERY)
Payer: MEDICARE

## 2023-11-13 VITALS
DIASTOLIC BLOOD PRESSURE: 78 MMHG | RESPIRATION RATE: 16 BRPM | HEIGHT: 66 IN | BODY MASS INDEX: 26.44 KG/M2 | WEIGHT: 164.5 LBS | HEART RATE: 85 BPM | OXYGEN SATURATION: 100 % | SYSTOLIC BLOOD PRESSURE: 120 MMHG

## 2023-11-13 DIAGNOSIS — K44.9 HIATAL HERNIA WITH GASTROESOPHAGEAL REFLUX: ICD-10-CM

## 2023-11-13 DIAGNOSIS — K44.9 HIATAL HERNIA WITH GASTROESOPHAGEAL REFLUX: Primary | ICD-10-CM

## 2023-11-13 DIAGNOSIS — K21.9 HIATAL HERNIA WITH GASTROESOPHAGEAL REFLUX: Primary | ICD-10-CM

## 2023-11-13 DIAGNOSIS — K21.9 HIATAL HERNIA WITH GASTROESOPHAGEAL REFLUX: ICD-10-CM

## 2023-11-13 PROCEDURE — 74240 X-RAY XM UPR GI TRC 1CNTRST: CPT

## 2023-11-13 PROCEDURE — G0463 HOSPITAL OUTPT CLINIC VISIT: HCPCS | Performed by: THORACIC SURGERY (CARDIOTHORACIC VASCULAR SURGERY)

## 2023-11-13 PROCEDURE — 99024 POSTOP FOLLOW-UP VISIT: CPT | Performed by: THORACIC SURGERY (CARDIOTHORACIC VASCULAR SURGERY)

## 2023-11-13 ASSESSMENT — PAIN SCALES - GENERAL: PAINLEVEL: NO PAIN (1)

## 2023-11-13 NOTE — PROGRESS NOTES
"THORACIC SURGERY FOLLOW UP VISIT    Dear Dr. Goldberg,  I saw Talya Walter in follow-up today. The clinical summary follows:     PREOP DIAGNOSIS   Recurrent hiatal hernia with reflux  PROCEDURE   Robotic-assisted redo laparoscopic repair of hiatal hernia  EGD  Gastrostomy  Left  chest tube insertion  Lysis of adhesions for greater than 45 minutes  DATE OF PROCEDURE  10/18/2023    COMPLICATIONS  Rectus sheath hematoma     INTERVAL STUDIES  Esophagram 10/13/2023:  Postoperative changes    /78   Pulse 85   Resp 16   Ht 1.676 m (5' 6\")   Wt 74.6 kg (164 lb 8 oz)   SpO2 100%   BMI 26.55 kg/m     Physical Exam  Constitutional:       Appearance: Normal appearance.   Cardiovascular:      Rate and Rhythm: Normal rate.   Pulmonary:      Effort: Pulmonary effort is normal.   Abdominal:      Palpations: Abdomen is soft.      Comments: Incisions healing well.  PEG site clean and intact.   Skin:     General: Skin is warm and dry.   Neurological:      Mental Status: She is alert and oriented to person, place, and time.   Psychiatric:         Mood and Affect: Mood normal.         Behavior: Behavior normal.         Thought Content: Thought content normal.         Judgment: Judgment normal.          SUBJECTIVE  She has pain mainly at night and takes one oxycodone at night. She otherwise uses tylenol and robaxin. She is swallowing fine and has no dysphagia. She did throw up once when eating pizza toppings.      IMPRESSION (K44.9,  K21.9) Hiatal hernia with gastroesophageal reflux sp redo hiatal hernia repair 10/18/2023  (primary encounter diagnosis)     This patient is a 78 year old woman who is doing well after undergoing a redo hiatal hernia repair on 10/18/2023. This was complicated by a rectus sheath hematoma.    PLAN  I spent 20 min on the date of the encounter in chart review, patient visit, review of tests, documentation and/or discussion with other providers about the issues documented above. I reviewed the plan as " follows:  I cut the gastrostomy at 1 cm and allowed the disc to fall into the stomach to pass. I explained there is about a 1 in 300 chance of bowel obstruction. She preferred me to cut the tube rather than pull it out given a potential bleeding risk.    Follow up in 1 year with esophagram.    All questions were answered and the patient and present family were in agreement with the plan.  I appreciate the opportunity to participate in the care of your patient and will keep you updated.  Sincerely,       Jarad Cheung MD

## 2023-11-13 NOTE — LETTER
"    11/13/2023         RE: Talya Walter  [address] [facility] [address]        Dear Colleague,    Thank you for referring your patient, Talya Walter, to the [facility]. Please see a copy of my visit note below.    THORACIC SURGERY FOLLOW UP VISIT    Dear Dr. Goldberg,  I saw Talya Walter in follow-up today. The clinical summary follows:     PREOP DIAGNOSIS   Recurrent hiatal hernia with reflux  PROCEDURE   Robotic-assisted redo laparoscopic repair of hiatal hernia  EGD  Gastrostomy  Left  chest tube insertion  Lysis of adhesions for greater than 45 minutes  DATE OF PROCEDURE  10/18/2023    COMPLICATIONS  Rectus sheath hematoma     INTERVAL STUDIES  Esophagram 10/13/2023:  Postoperative changes    /78   Pulse 85   Resp 16   Ht 1.676 m (5' 6\")   Wt 74.6 kg (164 lb 8 oz)   SpO2 100%   BMI 26.55 kg/m     Physical Exam  Constitutional:       Appearance: Normal appearance.   Cardiovascular:      Rate and Rhythm: Normal rate.   Pulmonary:      Effort: Pulmonary effort is normal.   Abdominal:      Palpations: Abdomen is soft.      Comments: Incisions healing well.  PEG site clean and intact.   Skin:     General: Skin is warm and dry.   Neurological:      Mental Status: She is alert and oriented to person, place, and time.   Psychiatric:         Mood and Affect: Mood normal.         Behavior: Behavior normal.         Thought Content: Thought content normal.         Judgment: Judgment normal.          SUBJECTIVE  She has pain mainly at night and takes one oxycodone at night. She otherwise uses tylenol and robaxin. She is swallowing fine and has no dysphagia. She did throw up once when eating pizza toppings.      IMPRESSION (K44.9,  K21.9) Hiatal hernia with gastroesophageal reflux sp redo hiatal hernia repair 10/18/2023  (primary encounter diagnosis)     This patient is a 78 year old woman who is doing well after undergoing a redo hiatal hernia repair on 10/18/2023. This " "was complicated by a rectus sheath hematoma.    PLAN  I spent 20 min on the date of the encounter in chart review, patient visit, review of tests, documentation and/or discussion with other providers about the issues documented above. I reviewed the plan as follows:  I cut the gastrostomy at 1 cm and allowed the disc to fall into the stomach to pass. I explained there is about a 1 in 300 chance of bowel obstruction. She preferred me to cut the tube rather than pull it out given a potential bleeding risk.    Follow up in 1 year with esophagram.    All questions were answered and the patient and present family were in agreement with the plan.  I appreciate the opportunity to participate in the care of your patient and will keep you updated.  Sincerely,       Jarad Cheung MD     Oncology Rooming Note    November 13, 2023 3:17 PM   Talya Walter is a 78 year old female who presents for:    Chief Complaint   Patient presents with     Oncology Clinic Visit     Initial Vitals: /78   Pulse 85   Resp 16   Ht 1.676 m (5' 6\")   Wt 74.6 kg (164 lb 8 oz)   SpO2 100%   BMI 26.55 kg/m   Estimated body mass index is 26.55 kg/m  as calculated from the following:    Height as of this encounter: 1.676 m (5' 6\").    Weight as of this encounter: 74.6 kg (164 lb 8 oz). Body surface area is 1.86 meters squared.  No Pain (1) Comment: Data Unavailable   No LMP recorded. Patient is perimenopausal.  Allergies reviewed: Yes  Medications reviewed: Yes    Medications: Medication refills not needed today.  Pharmacy name entered into [employer]: [id number] [address] [address]    Clinical concerns: None       Meryl Jara MA                Again, thank you for allowing me to participate in the care of your patient.        Sincerely,        Jarad Cheung MD  "

## 2023-11-13 NOTE — PROGRESS NOTES
"Oncology Rooming Note    November 13, 2023 3:17 PM   Talya Walter is a 78 year old female who presents for:    Chief Complaint   Patient presents with    Oncology Clinic Visit     Initial Vitals: /78   Pulse 85   Resp 16   Ht 1.676 m (5' 6\")   Wt 74.6 kg (164 lb 8 oz)   SpO2 100%   BMI 26.55 kg/m   Estimated body mass index is 26.55 kg/m  as calculated from the following:    Height as of this encounter: 1.676 m (5' 6\").    Weight as of this encounter: 74.6 kg (164 lb 8 oz). Body surface area is 1.86 meters squared.  No Pain (1) Comment: Data Unavailable   No LMP recorded. Patient is perimenopausal.  Allergies reviewed: Yes  Medications reviewed: Yes    Medications: Medication refills not needed today.  Pharmacy name entered into [employer]: [id number] - [place] - [address]    Clinical concerns: None       Meryl Jara MA              "

## 2023-11-16 ENCOUNTER — VIRTUAL VISIT (OUTPATIENT)
Dept: HEMATOLOGY | Facility: CLINIC | Age: 78
End: 2023-11-16
Attending: INTERNAL MEDICINE
Payer: MEDICARE

## 2023-11-16 DIAGNOSIS — D68.020 TYPE 2A VON WILLEBRAND DISEASE (H): Primary | ICD-10-CM

## 2023-11-16 PROCEDURE — 99213 OFFICE O/P EST LOW 20 MIN: CPT | Mod: 95 | Performed by: INTERNAL MEDICINE

## 2023-11-16 RX ORDER — OFLOXACIN 3 MG/ML
SOLUTION AURICULAR (OTIC)
COMMUNITY
Start: 2023-03-08 | End: 2024-06-03

## 2023-11-16 NOTE — PROGRESS NOTES
[facility]  [address] [place] [address]  Phone: [phone], Fax: [phone]      Outpatient Visit Note:    Patient: Talya Walter  MRN: [id number]  : 1945  LIN: 2023    Talya Walter is a 78 year old woman with a history of VWD who returns for routine follow up.      Assessment:  In summary, Talya Walter is a 78 year old woman with Type 2A vWD, who has done well with recent hiatal hernia repair, though did have complications including a rectus sheath hematoma followed by a GI bleed (melena and drop in hemoglobin).    .  She has no symptoms of recurrent bleeding since her second dose of recombinant von Willebrand factor.  She reports she is getting back to her usual daily activities.  She is fatigued and feels cold much of the time, which is consistent with her current diagnosis of anemia, likely from blood loss.    I do think she has overall done well from a von Willebrand factor standpoint.  Her VWF activity levels remain in the normal range throughout her hospital stay but I think she had surgical complications that led to her rectus sheath hematoma rather than bleeding from her von Willebrand disease.      Recommendations:  I counseled the patient about my assessment of her response to VWF therapy and surgery as above  No further VWF therapy required  Check CBC in 2024 to assess anemia resolution  Call with melena or bleeding  RTC as needed    25 minutes spent by me on the date of the encounter doing chart review, review of test results, interpretation of tests, patient visit, and documentation       Cesar Prasad MD   of Medicine, Division of Hematology, Oncology and Transplantation  [employer]     Video-Visit Details:    Type of service:  Video Visit  Video Start Time:  256 PM  Video End Time (time video stopped): 305 PM    Originating Location (pt. Location): Home    Distant  Location (provider location):  [facility]     Mode of Communication:  Video Conference via [employer]    --------------------------------------------------------  History: Talya Walter is a 78 year old woman with a history of type 2A von Willebrand disease who recently had hiatal hernia repair, complicated by rectus sheath hematoma and gastrointestinal bleeding about a week after her surgery.  She has tolerated her doses of recombinant von Willebrand factor well without any complications.       Latest Reference Range & Units 10/18/23 16:49 10/19/23 13:19 10/20/23 12:28 10/21/23 03:53   Factor 8 Assay 55 - 200 % 161 141 141 131   von Willebrand Factor Activity 50 - 180 % 90 91 116 92   von Willebrand Factor Antigen 50 - 200 % 227 (H) 238 (H) 237 (H) 211 (H)   (H): Data is abnormally high    When she left the hospital, her VWF activity was 66%.  Her hemoglobin had dropped so she was transfused 2 units     Latest Reference Range & Units 10/24/23 03:58 11/02/23 13:59 11/07/23 12:13   WBC 4.0 - 11.0 10e3/uL 6.7 8.6 7.6   Hemoglobin 11.7 - 15.7 g/dL 7.2 (L) 10.0 (L) 8.3 (L)   Hematocrit 35.0 - 47.0 % 22.1 (L) 31.9 (L) 26.0 (L)   Platelet Count 150 - 450 10e3/uL 263 384 378   (L): Data is abnormally low    She did get quite anemic after surgery and then about a week postop when she came into clinic for another dose of factor associated with melena and drop in hemoglobin (10.0 to 8.3).      She is feeling fatigued and cold at times.  She has had no more melena or hematochezia. however, despite these she is getting back to her usual daily activities.  She is planning to go back to her previous hematologist for a repeat CBC check and January.    Objective:  Exam:  There is no height or weight on file to calculate BMI.   Constitutional: Appears well, no distress  Eyes: no discharge, injection or icterus  Respiratory: no cough or labored breathing  Skin: no rashes or  petechiae  Neurological: no deficits appreciated, speech is fluent  Psych: affect is normal    Labs:   Latest Reference Range & Units 10/15/23 11:26   Factor 8 Assay 55 - 200 % 60   Ristocetin Cofactor Activity 51 - 215 % <10 (L)   von Willebrand Factor Multimers  Absence of   the high- and intermediate-molecular weight multimers    von Willebrand Factor Activity 50 - 180 % 20 (L)   von Willebrand Factor Antigen 50 - 200 % 66   (L): Data is abnormally low     Latest Reference Range & Units 10/24/23 03:58 11/02/23 13:59 11/07/23 12:13   WBC 4.0 - 11.0 10e3/uL 6.7 8.6 7.6   Hemoglobin 11.7 - 15.7 g/dL 7.2 (L) 10.0 (L) 8.3 (L)   Hematocrit 35.0 - 47.0 % 22.1 (L) 31.9 (L) 26.0 (L)   Platelet Count 150 - 450 10e3/uL 263 384 378   (L): Data is abnormally low    Imaging:  none

## 2023-11-16 NOTE — PROGRESS NOTES
Patient was contacted to complete the pre-visit call prior to their telephone visit with the provider.     Allergies and medications were reviewed.     I thanked them for their time to cover this information.     Ivy Cheney MA

## 2023-12-02 ENCOUNTER — APPOINTMENT (OUTPATIENT)
Dept: CT IMAGING | Facility: CLINIC | Age: 78
End: 2023-12-02
Attending: EMERGENCY MEDICINE
Payer: MEDICARE

## 2023-12-02 ENCOUNTER — OFFICE VISIT (OUTPATIENT)
Dept: URGENT CARE | Facility: URGENT CARE | Age: 78
End: 2023-12-02
Payer: MEDICARE

## 2023-12-02 ENCOUNTER — HOSPITAL ENCOUNTER (EMERGENCY)
Facility: CLINIC | Age: 78
Discharge: HOME | End: 2023-12-02
Attending: SOCIAL WORKER | Admitting: SOCIAL WORKER
Payer: MEDICARE

## 2023-12-02 VITALS
HEART RATE: 79 BPM | BODY MASS INDEX: 26.68 KG/M2 | SYSTOLIC BLOOD PRESSURE: 141 MMHG | OXYGEN SATURATION: 98 % | TEMPERATURE: 98.1 F | HEIGHT: 66 IN | WEIGHT: 166 LBS | DIASTOLIC BLOOD PRESSURE: 68 MMHG | RESPIRATION RATE: 16 BRPM

## 2023-12-02 VITALS
DIASTOLIC BLOOD PRESSURE: 71 MMHG | BODY MASS INDEX: 26.79 KG/M2 | OXYGEN SATURATION: 100 % | WEIGHT: 166 LBS | HEART RATE: 74 BPM | SYSTOLIC BLOOD PRESSURE: 152 MMHG | TEMPERATURE: 97.4 F

## 2023-12-02 DIAGNOSIS — L53.9 ERYTHEMATOUS CONDITION: Primary | ICD-10-CM

## 2023-12-02 DIAGNOSIS — L03.311 CELLULITIS OF ABDOMINAL WALL: ICD-10-CM

## 2023-12-02 DIAGNOSIS — K94.23: ICD-10-CM

## 2023-12-02 LAB
ALBUMIN SERPL BCG-MCNC: 3.8 G/DL (ref 3.5–5.2)
ALP SERPL-CCNC: 135 U/L (ref 40–150)
ALT SERPL W P-5'-P-CCNC: 31 U/L (ref 0–50)
ANION GAP SERPL CALCULATED.3IONS-SCNC: 10 MMOL/L (ref 7–15)
AST SERPL W P-5'-P-CCNC: 31 U/L (ref 0–45)
BASOPHILS # BLD AUTO: 0 10E3/UL (ref 0–0.2)
BASOPHILS NFR BLD AUTO: 1 %
BILIRUB SERPL-MCNC: 0.4 MG/DL
BUN SERPL-MCNC: 12.7 MG/DL (ref 8–23)
CALCIUM SERPL-MCNC: 9.1 MG/DL (ref 8.8–10.2)
CHLORIDE SERPL-SCNC: 103 MMOL/L (ref 98–107)
CREAT SERPL-MCNC: 0.72 MG/DL (ref 0.51–0.95)
DEPRECATED HCO3 PLAS-SCNC: 24 MMOL/L (ref 22–29)
EGFRCR SERPLBLD CKD-EPI 2021: 85 ML/MIN/1.73M2
EOSINOPHIL # BLD AUTO: 0.1 10E3/UL (ref 0–0.7)
EOSINOPHIL NFR BLD AUTO: 1 %
ERYTHROCYTE [DISTWIDTH] IN BLOOD BY AUTOMATED COUNT: 19.7 % (ref 10–15)
GLUCOSE SERPL-MCNC: 98 MG/DL (ref 70–99)
HCT VFR BLD AUTO: 29.9 % (ref 35–47)
HGB BLD-MCNC: 9.5 G/DL (ref 11.7–15.7)
IMM GRANULOCYTES # BLD: 0.1 10E3/UL
IMM GRANULOCYTES NFR BLD: 1 %
LYMPHOCYTES # BLD AUTO: 3 10E3/UL (ref 0.8–5.3)
LYMPHOCYTES NFR BLD AUTO: 46 %
MCH RBC QN AUTO: 34.2 PG (ref 26.5–33)
MCHC RBC AUTO-ENTMCNC: 31.8 G/DL (ref 31.5–36.5)
MCV RBC AUTO: 108 FL (ref 78–100)
MONOCYTES # BLD AUTO: 0.5 10E3/UL (ref 0–1.3)
MONOCYTES NFR BLD AUTO: 7 %
NEUTROPHILS # BLD AUTO: 2.9 10E3/UL (ref 1.6–8.3)
NEUTROPHILS NFR BLD AUTO: 44 %
NRBC # BLD AUTO: 0 10E3/UL
NRBC BLD AUTO-RTO: 1 /100
PLATELET # BLD AUTO: 281 10E3/UL (ref 150–450)
POTASSIUM SERPL-SCNC: 4 MMOL/L (ref 3.4–5.3)
PROT SERPL-MCNC: 6.4 G/DL (ref 6.4–8.3)
RBC # BLD AUTO: 2.78 10E6/UL (ref 3.8–5.2)
SODIUM SERPL-SCNC: 137 MMOL/L (ref 135–145)
WBC # BLD AUTO: 6.4 10E3/UL (ref 4–11)

## 2023-12-02 PROCEDURE — 250N000009 HC RX 250: Performed by: SOCIAL WORKER

## 2023-12-02 PROCEDURE — 74177 CT ABD & PELVIS W/CONTRAST: CPT | Mod: MG

## 2023-12-02 PROCEDURE — 36415 COLL VENOUS BLD VENIPUNCTURE: CPT | Performed by: EMERGENCY MEDICINE

## 2023-12-02 PROCEDURE — 80053 COMPREHEN METABOLIC PANEL: CPT | Performed by: SOCIAL WORKER

## 2023-12-02 PROCEDURE — G1010 CDSM STANSON: HCPCS

## 2023-12-02 PROCEDURE — 250N000013 HC RX MED GY IP 250 OP 250 PS 637: Performed by: SOCIAL WORKER

## 2023-12-02 PROCEDURE — 85025 COMPLETE CBC W/AUTO DIFF WBC: CPT | Performed by: SOCIAL WORKER

## 2023-12-02 PROCEDURE — 99204 OFFICE O/P NEW MOD 45 MIN: CPT | Mod: 24

## 2023-12-02 PROCEDURE — 85025 COMPLETE CBC W/AUTO DIFF WBC: CPT | Performed by: EMERGENCY MEDICINE

## 2023-12-02 PROCEDURE — 99285 EMERGENCY DEPT VISIT HI MDM: CPT | Mod: 25

## 2023-12-02 PROCEDURE — 80053 COMPREHEN METABOLIC PANEL: CPT | Performed by: EMERGENCY MEDICINE

## 2023-12-02 PROCEDURE — 250N000011 HC RX IP 250 OP 636: Performed by: SOCIAL WORKER

## 2023-12-02 RX ORDER — DOXYCYCLINE 100 MG/1
100 CAPSULE ORAL EVERY 12 HOURS SCHEDULED
Status: DISCONTINUED | OUTPATIENT
Start: 2023-12-02 | End: 2023-12-02

## 2023-12-02 RX ORDER — DOXYCYCLINE 100 MG/1
100 CAPSULE ORAL ONCE
Status: COMPLETED | OUTPATIENT
Start: 2023-12-02 | End: 2023-12-02

## 2023-12-02 RX ORDER — DOXYCYCLINE HYCLATE 100 MG
100 TABLET ORAL 2 TIMES DAILY
Qty: 10 TABLET | Refills: 0 | Status: SHIPPED | OUTPATIENT
Start: 2023-12-02 | End: 2023-12-07

## 2023-12-02 RX ORDER — IOPAMIDOL 755 MG/ML
83 INJECTION, SOLUTION INTRAVASCULAR ONCE
Status: COMPLETED | OUTPATIENT
Start: 2023-12-02 | End: 2023-12-02

## 2023-12-02 RX ADMIN — AMOXICILLIN AND CLAVULANATE POTASSIUM 1 TABLET: 875; 125 TABLET, FILM COATED ORAL at 17:39

## 2023-12-02 RX ADMIN — DOXYCYCLINE HYCLATE 100 MG: 100 CAPSULE ORAL at 17:39

## 2023-12-02 RX ADMIN — SODIUM CHLORIDE 64 ML: 9 INJECTION, SOLUTION INTRAVENOUS at 15:16

## 2023-12-02 RX ADMIN — IOPAMIDOL 83 ML: 755 INJECTION, SOLUTION INTRAVENOUS at 15:16

## 2023-12-02 ASSESSMENT — ACTIVITIES OF DAILY LIVING (ADL): ADLS_ACUITY_SCORE: 35

## 2023-12-02 NOTE — DISCHARGE INSTRUCTIONS
You were seen in the emergency department for a rash and pain at the site of your previous G-tube.  The CT scan here shows that you have a malpositioned part of the tube that is still in the tissue.  I spoke with your surgeon, he felt that it was okay to go home and have outpatient surgery scheduled to have this removed.  You were comfortable with this plan.  We gave you a dose of antibiotics here and will send you home with a prescription for antibiotics.    You should receive a call from your surgeon's team tomorrow to schedule the tube removal.  If you do not hear from them please call them at: [phone].    If you start to have a fever, if you have severely worsening pain at the site of your tube, if you have nausea and vomiting, you develop chills, feel weak, or other concerning symptoms, please come back to the emergency department immediately.

## 2023-12-02 NOTE — ED PROVIDER NOTES
"  History     Chief Complaint:  Post-op Problem       The history is provided by the patient.      Talya Walter is a 78 year old female with a history of Von Willebrand's disease who presents with a post-op problem after a G-tube removal surgery. The patient reports that she had been carrying her bag with her G-tube for about 1 month, and had noticed some redness and irritation at the entry around 1 week ago. She then had a G-tube removal surgery where the tube was not fully removed but cut partially, with a piece still remaining inside, due to concern for bleeding too much given her history. She since has had drainage, soreness, swelling, and redness from her surgical wound. She presented to urgent care who recommended she come in to the ED. Talya denies any fever, nausea, vomiting, shaking chills, diarrhea, or any current active bleeding. She is not taking any antibiotics.Otherwise feeling her usual self.     Independent Historian:    None - patient only    Review of External Notes:  Office visit from 11/13/23: \"I cut the gastrostomy at 1 cm and allowed the disc to fall into the stomach to pass. I explained there is about a 1 in 300 chance of bowel obstruction. She preferred me to cut the tube rather than pull it out given a potential bleeding risk.     Follow up in 1 year with esophagram.      Medications:    Lexapro  Glucosamine-chondroitin  Robaxin  Prilosec  Miralax  Senokot  Carafate    Past Medical History:    Adenomatous polyp of colon   Anemia associated with acute blood loss   Harden's esophagus without dysplasia   Diaphragmatic hernia   DJD, lumbar  Gastrointestinal hemorrhage with hematemesis   Hiatal hernia with gastroesophageal reflux   Hyperlipidemia   Osteoporosis   Plantar fascial fibromatosis   Von Willebrand's disease    Past Surgical History:    Combined EGD  Herniorrhaphy, hiatal  Laparotomy, exploratory  Tonsillectomy  [place] teeth extraction  Abdominal exploration surgery  Tunneled " "venous port placement  Breast biopsy     Physical Exam   Patient Vitals for the past 24 hrs:   BP Temp Temp src Pulse Resp SpO2 Height Weight   12/02/23 1700 -- -- -- -- -- 98 % -- --   12/02/23 1600 -- -- -- -- -- 98 % -- --   12/02/23 1535 -- -- -- 63 16 100 % -- --   12/02/23 1530 136/82 -- -- -- -- -- -- --   12/02/23 1341 (!) 153/65 98.1  F (36.7  C) Oral 71 16 99 % 1.676 m (5' 6\") 75.3 kg (166 lb)        Physical Exam    General: Overall stable and nontoxic appearing  HEENT: Conjunctivae clear, no scleral icterus, mucous membranes moist  Neuro: Alert, moving all extremities equally with intention  CV: Regular rate and rhythm, radial and DP pulses equal  Respiratory: No signs of respiratory distress, lungs clear to auscultation bilaterally   Abdomen: Soft, without rigidity or rebound throughout  MSK: No lower extremity swelling or tenderness  Erythema at the site of the prior G tube with drainage       Emergency Department Course     Imaging:  CT Abdomen Pelvis w Contrast   Final Result   IMPRESSION:    1.  Malpositioned percutaneous gastrostomy tube within the left upper abdominal wall soft tissues. There is extensive surrounding soft tissue thickening/inflammation, however no fluid collection.   2.  Additional details in the findings.        Report per radiology    Laboratory:  Labs Ordered and Resulted from Time of ED Arrival to Time of ED Departure   CBC WITH PLATELETS AND DIFFERENTIAL - Abnormal       Result Value    WBC Count 6.4      RBC Count 2.78 (*)     Hemoglobin 9.5 (*)     Hematocrit 29.9 (*)      (*)     MCH 34.2 (*)     MCHC 31.8      RDW 19.7 (*)     Platelet Count 281      % Neutrophils 44      % Lymphocytes 46      % Monocytes 7      % Eosinophils 1      % Basophils 1      % Immature Granulocytes 1      NRBCs per 100 WBC 1 (*)     Absolute Neutrophils 2.9      Absolute Lymphocytes 3.0      Absolute Monocytes 0.5      Absolute Eosinophils 0.1      Absolute Basophils 0.0      Absolute " Immature Granulocytes 0.1      Absolute NRBCs 0.0     COMPREHENSIVE METABOLIC PANEL - Normal    Sodium 137      Potassium 4.0      Carbon Dioxide (CO2) 24      Anion Gap 10      Urea Nitrogen 12.7      Creatinine 0.72      GFR Estimate 85      Calcium 9.1      Chloride 103      Glucose 98      Alkaline Phosphatase 135      AST 31      ALT 31      Protein Total 6.4      Albumin 3.8      Bilirubin Total 0.4       Emergency Department Course & Assessments:    Interventions:  Medications   amoxicillin-clavulanate (AUGMENTIN) 875-125 MG per tablet 1 tablet (has no administration in time range)   doxycycline hyclate (VIBRAMYCIN) capsule 100 mg (has no administration in time range)   iohexol (OMNIPAQUE) 140 MG/ML solution for oral use 25 mL (25 mLs Oral $Given 23 1412)   iopamidol (ISOVUE-370) solution 83 mL (83 mLs Intravenous $Given 23 1516)   Saline (64 mLs As instructed $Given 23 1516)        Assessments:  1627 I obtained history and examined the patient as noted above.  1654 I rechecked the patient and explained findings. We discussed plan for discharge and patient is in agreement with plan.     Independent Interpretation (X-rays, CTs, rhythm strip):  None    Consultations/Discussion of Management or Tests:  ED Course as of 23 1716   Sat Dec 02, 2023   1641 Spoke with Dr Cheung. If she feels she would like to go home could arrange to have removal of tube in a few days. [facility] where GI could be on hand for endoscopic removal if necessary. Recommends starting abx. Clinic phone number.   1702 Spoke with patient    1637 I spoke with Dr. Cheung, [facility] thoracic surgery, regarding the patient's history and presentation in the emergency department today.    Social Determinants of Health affecting care:  None     Disposition:  The patient was discharged to home.     Impression & Plan    CMS Diagnoses: None    Medical Decision Makin-year-old female with a history of von Willebrand  disease and hiatal hernia repair on 10/18 who presented to the emergency department with a chief complaint of redness and pain at the site of her previous PEG tube.  Patient overall stable nontoxic-appearing afebrile here and reported no fevers at home.  She had no c signs of systemic infection here in the emergency department and reported no symptoms at home.  No leukocytosis.  Her CT did demonstrate what appeared to be migration of the internal bumper into the soft tissues with surrounding stranding and no abscess therefore elected to start her on antibiotics.  I spoke with her surgeon Dr. Cheung, who reviewed her case and stated it would be reasonable for her to have outpatient follow-up to have the remainder of the tube removed if she wanted to go home.  He will have someone call her tomorrow. Patient preferred to go home and follow up.  At this time I do not see any signs of systemic infection feel it is reasonable to go home with close monitoring for any changes.  She was given antibiotics here in the emergency department and a prescription sent to her pharmacy, I gave her strict return precautions.  Also gave her the phone number given to me by Dr. Cheung in case someone does not call her tomorrow.  She is comfortable with this plan.  She was discharged in stable condition.    Diagnosis:    ICD-10-CM    1. Cellulitis of abdominal wall  L03.311       2. Migration of percutaneous endoscopic gastrostomy (PEG) tube (H)  K94.23            Discharge Medications:  New Prescriptions    AMOXICILLIN-CLAVULANATE (AUGMENTIN) 875-125 MG TABLET    Take 1 tablet by mouth 2 times daily for 5 days    DOXYCYCLINE HYCLATE (VIBRA-TABS) 100 MG TABLET    Take 1 tablet (100 mg) by mouth 2 times daily for 5 days     Scribe Disclosure:  Raisa ALVARADO, am serving as a scribe at 3:47 PM on 12/2/2023 to document services personally performed by Jennifer Rios MD, based on my observations and the provider's statements to  me.  12/2/2023   Jennifer Rios MD Wu Klasek, Connie, MD  12/03/23 0040

## 2023-12-02 NOTE — PROGRESS NOTES
SUBJECTIVE:  Talya Walter is a 78 year old female who presents to the clinic today for a rash.  Onset of rash was 4 week(s) ago.   Rash is worsening.  Location of the rash: abdomen.  Quality/symptoms of rash: burning, painful, and red   Symptoms are moderate and rash seems to be worsening.  Previous history of a similar rash? No  Recent exposure history: extending for GTUBE site    Associated symptoms include: nothing.    Past Medical History:   Diagnosis Date    Von Willebrand's disease (H)     type 2A     Current Outpatient Medications   Medication Sig Dispense Refill    acetaminophen (TYLENOL) 325 MG tablet Take 2 tablets (650 mg) by mouth every 4 hours as needed for other (For optimal non-opioid multimodal pain management to improve pain control.)      clotrimazole (LOTRIMIN) 1 % external solution Instill 5 drops into each ear 3 (three) times daily.      escitalopram (LEXAPRO) 10 MG tablet Take 10 mg by mouth every morning      Ferrous Sulfate (IRON) 28 MG TABS Take 1 tablet by mouth every morning      FOLIC ACID PO Take 1 tablet by mouth daily      glucosamine-chondroitin 500-400 MG CAPS per capsule Take 1 capsule by mouth every morning      methocarbamol (ROBAXIN) 500 MG tablet Take 1 tablet (500 mg) by mouth every 6 hours as needed for muscle spasms 40 tablet 0    ofloxacin (FLOXIN) 0.3 % otic solution PLACE 5 DROPS INTO BOTH EARS TWO TIMES A DAY.      omeprazole (PRILOSEC) 40 MG DR capsule Take 40 mg by mouth 2 times daily      oxyCODONE (ROXICODONE) 5 MG tablet Take 1 tablet (5 mg) by mouth every 4 hours as needed for moderate pain 40 tablet 0    polyethylene glycol (MIRALAX) 17 g packet Take 17 g by mouth daily 7 packet 0    senna-docusate (SENOKOT-S/PERICOLACE) 8.6-50 MG tablet Take 1 tablet by mouth 2 times daily 14 tablet 0    sucralfate (CARAFATE) 1 GM tablet Take 1 g by mouth 4 times daily      triamcinolone (KENALOG) 0.1 % external ointment Apply topically daily      UNABLE TO FIND Take 1 capsule  by mouth every morning MEDICATION NAME: Stool softener      vitamin C (ASCORBIC ACID) 500 MG tablet Take 1 tablet by mouth every morning      Vitamin D3 (CHOLECALCIFEROL) 25 mcg (1000 units) tablet Take 1 tablet by mouth every morning       Social History     Tobacco Use    Smoking status: Former     Types: Cigarettes     Quit date:      Years since quittin.9     Passive exposure: Past    Smokeless tobacco: Never   Substance Use Topics    Alcohol use: Yes     Comment: 1-2 glasses of wine daily       ROS:  Review of systems negative except as stated above.    EXAM:   BP (!) 152/71   Pulse 74   Temp 97.4  F (36.3  C) (Tympanic)   Wt 75.3 kg (166 lb)   SpO2 100%   BMI 26.79 kg/m    GENERAL: alert, no acute distress.  SKIN: diffuse erythema around warm firm insertion site  RESP: lungs clear to auscultation - no rales, rhonchi or wheezes  CV: regular rates and rhythm, normal S1 S2, no murmur noted    ASSESSMENT:  (L53.9) Erythematous condition  (primary encounter diagnosis)  Comment: at surgical site  Plan: to ED for assessment

## 2023-12-02 NOTE — ED TRIAGE NOTES
Patient had an esophogeal hernia repair and a g-tube placed on 10/18/23. G-tube was removed, patient presents today with complaints of pain, swelling and drainage around the g-tube site. She was referred here by  with concerns for an abscess which will require additional imaging to see how deep it is.      Triage Assessment (Adult)       Row Name 12/02/23 1724          Triage Assessment    Airway WDL WDL        Respiratory WDL    Respiratory WDL WDL        Skin Circulation/Temperature WDL    Skin Circulation/Temperature WDL WDL        Cardiac WDL    Cardiac WDL WDL        Peripheral/Neurovascular WDL    Peripheral Neurovascular WDL WDL        Cognitive/Neuro/Behavioral WDL    Cognitive/Neuro/Behavioral WDL WDL

## 2023-12-05 ENCOUNTER — TELEPHONE (OUTPATIENT)
Dept: ONCOLOGY | Facility: CLINIC | Age: 78
End: 2023-12-05
Payer: MEDICARE

## 2023-12-05 ENCOUNTER — PREP FOR PROCEDURE (OUTPATIENT)
Dept: SURGERY | Facility: CLINIC | Age: 78
End: 2023-12-05
Payer: MEDICARE

## 2023-12-05 ENCOUNTER — PATIENT OUTREACH (OUTPATIENT)
Dept: SURGERY | Facility: CLINIC | Age: 78
End: 2023-12-05
Payer: MEDICARE

## 2023-12-05 DIAGNOSIS — M79.5 RETAINED FOREIGN BODY IN SOFT TISSUE: Primary | ICD-10-CM

## 2023-12-05 LAB
ABO/RH(D): NORMAL
ANTIBODY SCREEN: NEGATIVE
SPECIMEN EXPIRATION DATE: NORMAL

## 2023-12-05 NOTE — TELEPHONE ENCOUNTER
Called pt to confirm 12/7 surgery date with Dr. Cheung and got no answer. Left voicemail message with direct call back number [phone].    Denise Goyal on 12/5/2023 at 11:12 AM

## 2023-12-05 NOTE — TELEPHONE ENCOUNTER
Spoke with patient to schedule procedure with Dr. Cheung   Procedure was scheduled on 12/07 at [facility] OR  Patient will have H&P with Updated the day of by surgeon    Patient is aware a COVID-19 test is needed before their procedure ONLY IF symptomatic.   (Patient is aware Thoracic is no longer requiring COVID-19 test)       Patient is aware a / is needed day of surgery.   Surgery Letter was sent via [employer],     Patient has my direct contact information for any further questions.      Called and confirmed 12/7 surgery with pt. Pt stated she spoke with the nurse, but forgot to ask about when to stop taking her meds and eating./drinking. Pt also stated that the date is tentative until she hears back from the nurse regarding her disease and any special things that need to be done prior to that. Writer routing to nurse for assistance.    Denise Goyal on 12/5/2023 at 1:17 PM

## 2023-12-06 ENCOUNTER — LAB (OUTPATIENT)
Dept: LAB | Facility: CLINIC | Age: 78
End: 2023-12-06
Payer: MEDICARE

## 2023-12-06 ENCOUNTER — TELEPHONE (OUTPATIENT)
Dept: HEMATOLOGY | Facility: CLINIC | Age: 78
End: 2023-12-06
Payer: MEDICARE

## 2023-12-06 ENCOUNTER — ANESTHESIA EVENT (OUTPATIENT)
Dept: SURGERY | Facility: CLINIC | Age: 78
End: 2023-12-06
Payer: MEDICARE

## 2023-12-06 DIAGNOSIS — M79.5 RETAINED FOREIGN BODY IN SOFT TISSUE: ICD-10-CM

## 2023-12-06 PROCEDURE — 86850 RBC ANTIBODY SCREEN: CPT

## 2023-12-06 PROCEDURE — 86901 BLOOD TYPING SEROLOGIC RH(D): CPT

## 2023-12-06 PROCEDURE — 36415 COLL VENOUS BLD VENIPUNCTURE: CPT

## 2023-12-06 PROCEDURE — 86900 BLOOD TYPING SEROLOGIC ABO: CPT

## 2023-12-06 ASSESSMENT — LIFESTYLE VARIABLES: TOBACCO_USE: 1

## 2023-12-06 NOTE — ANESTHESIA PREPROCEDURE EVALUATION
Anesthesia Pre-Procedure Evaluation    Patient: Talya Walter   MRN: [id number] : 1945        Procedure : Procedure(s):  INCISION AND DRAINAGE, WOUND, ABDOMINAL WALL  ESOPHAGOGASTRODUODENOSCOPY          Past Medical History:   Diagnosis Date    Von Willebrand's disease (H)     type 2A      Past Surgical History:   Procedure Laterality Date    ESOPHAGOSCOPY, GASTROSCOPY, DUODENOSCOPY (EGD), COMBINED N/A 10/18/2023    Procedure: Esophagoscopy, gastroscopy, duodenoscopy (EGD), combined, percutaneous endoscopic gastrostomy tube placement;  Surgeon: Jarad Cheugn MD;  Location: [facility] OR    HERNIORRHAPHY, HIATAL, ROBOT ASSISTED N/A 10/18/2023    Procedure: Lysis of adhesions >45 minutes, Robot-assisted laparoscopic hiatal hernia repair, left chest tube placement;  Surgeon: Jarad Cheung MD;  Location: [facility] OR    LAPAROTOMY EXPLORATORY        Allergies   Allergen Reactions    Desmopressin Anaphylaxis     PN: THROAT CLOSED    Aspirin      PN: POSSIBLE BLEED    Bee Venom Swelling    Meloxicam      Cause bleeding    Nsaids      PN: POSSIBLE BLEED      Social History     Tobacco Use    Smoking status: Former     Types: Cigarettes     Quit date:      Years since quittin.9     Passive exposure: Past    Smokeless tobacco: Never   Substance Use Topics    Alcohol use: Yes     Comment: 1-2 glasses of wine daily      Wt Readings from Last 1 Encounters:   23 75.3 kg (166 lb)        Anesthesia Evaluation   Pt has had prior anesthetic. Type: General.        ROS/MED HX  ENT/Pulmonary:     (+)                tobacco use, Past use,                      Neurologic:       Cardiovascular:     (+) Dyslipidemia - -   -  - -                                      METS/Exercise Tolerance:     Hematologic: Comments: Von Willebrand's disease type 2A. Recent assay of factor levels wnl.    (+)      anemia, history of blood transfusion, previous transfusion reaction,        Musculoskeletal:   (+)  arthritis,            "  GI/Hepatic:     (+) GERD (Harden's),                   Renal/Genitourinary:       Endo:       Psychiatric/Substance Use:       Infectious Disease:       Malignancy:       Other:            Physical Exam    Airway        Mallampati: II   TM distance: > 3 FB   Neck ROM: full   Mouth opening: > 3 cm    Respiratory Devices and Support         Dental       (+) Modest Abnormalities - crowns, retainers, 1 or 2 missing teeth      Cardiovascular   cardiovascular exam normal          Pulmonary   pulmonary exam normal                OUTSIDE LABS:  CBC:   Lab Results   Component Value Date    WBC 6.4 12/02/2023    WBC 7.6 11/07/2023    HGB 9.5 (L) 12/02/2023    HGB 8.3 (L) 11/07/2023    HCT 29.9 (L) 12/02/2023    HCT 26.0 (L) 11/07/2023     12/02/2023     11/07/2023     BMP:   Lab Results   Component Value Date     12/02/2023     10/24/2023    POTASSIUM 4.0 12/02/2023    POTASSIUM 4.2 10/24/2023    CHLORIDE 103 12/02/2023    CHLORIDE 103 10/24/2023    CO2 24 12/02/2023    CO2 26 10/24/2023    BUN 12.7 12/02/2023    BUN 8.8 10/24/2023    CR 0.72 12/02/2023    CR 0.44 (L) 10/24/2023    GLC 98 12/02/2023     (H) 10/24/2023     COAGS:   Lab Results   Component Value Date    PTT 36 10/20/2023    INR 1.37 (H) 10/20/2023    FIBR 501 (H) 10/20/2023     POC: No results found for: \"BGM\", \"HCG\", \"HCGS\"  HEPATIC:   Lab Results   Component Value Date    ALBUMIN 3.8 12/02/2023    PROTTOTAL 6.4 12/02/2023    ALT 31 12/02/2023    AST 31 12/02/2023    ALKPHOS 135 12/02/2023    BILITOTAL 0.4 12/02/2023     OTHER:   Lab Results   Component Value Date    PH 7.27 (L) 10/18/2023    LACT 2.8 (H) 10/18/2023    JACKIE 9.1 12/02/2023    MAG 1.5 (L) 10/18/2023    TSH 3.56 10/23/2023       Anesthesia Plan    ASA Status:  3       Anesthesia Type: General.     - Airway: ETT   Induction: Intravenous.   Maintenance: Balanced.   Techniques and Equipment:     - Lines/Monitors: 2nd IV     Consents    Anesthesia Plan(s) and " "associated risks, benefits, and realistic alternatives discussed. Questions answered and patient/representative(s) expressed understanding.     - Discussed: Risks, Benefits and Alternatives for BOTH SEDATION and the PROCEDURE were discussed     - Discussed with:  Patient      - Extended Intubation/Ventilatory Support Discussed: No.      - Patient is DNR/DNI Status: No     Use of blood products discussed: Yes.     - Discussed with: Patient.     - Consented: consented to blood products     Postoperative Care    Pain management: IV analgesics, Multi-modal analgesia.   PONV prophylaxis: Ondansetron (or other 5HT-3), Dexamethasone or Solumedrol     Comments:    Other Comments: Factor VIII prior to surgery has been ordered           JATINDER RUSH MD    I have reviewed the pertinent notes and labs in the chart from the past 30 days and (re)examined the patient.  Any updates or changes from those notes are reflected in this note.              # Overweight: Estimated body mass index is 26.79 kg/m  as calculated from the following:    Height as of 12/2/23: 1.676 m (5' 6\").    Weight as of 12/2/23: 75.3 kg (166 lb).      "

## 2023-12-06 NOTE — TELEPHONE ENCOUNTER
[id number]  Talya Walter  78 year old female  [facility] Diagnosis: Type 2A Von Willebrand Disease  [facility] Provider: Dr. Prasad    RN called pre-op RN staff ([phone]) at [facility]. They are able to see orders and do Humate-P infusion tomorrow. [facility] Pharmacy team in touch with pre-op team to make sure they have a dose and all supplies needed for the infusion tomorrow. Pharmacy will contact RN team if there are any issues.    RN updated patient.    Lina Regalado RN, BSN, PCCN  Nurse Clinician    [facility]  [address] [place] [address], [address], [place] [address]   Office, direct: [phone]  Main office number: [phone]  Pronouns: She, her, hers

## 2023-12-07 ENCOUNTER — ANESTHESIA (OUTPATIENT)
Dept: SURGERY | Facility: CLINIC | Age: 78
End: 2023-12-07
Payer: MEDICARE

## 2023-12-07 ENCOUNTER — HOSPITAL ENCOUNTER (OUTPATIENT)
Facility: CLINIC | Age: 78
Discharge: HOME | End: 2023-12-07
Attending: THORACIC SURGERY (CARDIOTHORACIC VASCULAR SURGERY) | Admitting: THORACIC SURGERY (CARDIOTHORACIC VASCULAR SURGERY)
Payer: MEDICARE

## 2023-12-07 VITALS
RESPIRATION RATE: 16 BRPM | HEIGHT: 66 IN | DIASTOLIC BLOOD PRESSURE: 63 MMHG | BODY MASS INDEX: 26.5 KG/M2 | HEART RATE: 60 BPM | TEMPERATURE: 98.2 F | WEIGHT: 164.9 LBS | SYSTOLIC BLOOD PRESSURE: 109 MMHG | OXYGEN SATURATION: 94 %

## 2023-12-07 DIAGNOSIS — Z18.9 RETAINED FOREIGN BODY: Primary | ICD-10-CM

## 2023-12-07 PROCEDURE — 258N000003 HC RX IP 258 OP 636: Performed by: NURSE ANESTHETIST, CERTIFIED REGISTERED

## 2023-12-07 PROCEDURE — 710N000012 HC RECOVERY PHASE 2, PER MINUTE: Performed by: THORACIC SURGERY (CARDIOTHORACIC VASCULAR SURGERY)

## 2023-12-07 PROCEDURE — 10120 INC&RMVL FB SUBQ TISS SMPL: CPT | Mod: 78 | Performed by: THORACIC SURGERY (CARDIOTHORACIC VASCULAR SURGERY)

## 2023-12-07 PROCEDURE — 710N000010 HC RECOVERY PHASE 1, LEVEL 2, PER MIN: Performed by: THORACIC SURGERY (CARDIOTHORACIC VASCULAR SURGERY)

## 2023-12-07 PROCEDURE — 360N000076 HC SURGERY LEVEL 3, PER MIN: Performed by: THORACIC SURGERY (CARDIOTHORACIC VASCULAR SURGERY)

## 2023-12-07 PROCEDURE — 250N000015 HC RX FACTOR IP MED 636 OP 636: Mod: JZ | Performed by: INTERNAL MEDICINE

## 2023-12-07 PROCEDURE — 250N000009 HC RX 250: Performed by: NURSE ANESTHETIST, CERTIFIED REGISTERED

## 2023-12-07 PROCEDURE — 272N000001 HC OR GENERAL SUPPLY STERILE: Performed by: THORACIC SURGERY (CARDIOTHORACIC VASCULAR SURGERY)

## 2023-12-07 PROCEDURE — 250N000011 HC RX IP 250 OP 636: Performed by: ANESTHESIOLOGY

## 2023-12-07 PROCEDURE — 250N000025 HC SEVOFLURANE, PER MIN: Performed by: THORACIC SURGERY (CARDIOTHORACIC VASCULAR SURGERY)

## 2023-12-07 PROCEDURE — 370N000017 HC ANESTHESIA TECHNICAL FEE, PER MIN: Performed by: THORACIC SURGERY (CARDIOTHORACIC VASCULAR SURGERY)

## 2023-12-07 PROCEDURE — 250N000009 HC RX 250: Performed by: THORACIC SURGERY (CARDIOTHORACIC VASCULAR SURGERY)

## 2023-12-07 PROCEDURE — 250N000011 HC RX IP 250 OP 636: Mod: JZ | Performed by: NURSE ANESTHETIST, CERTIFIED REGISTERED

## 2023-12-07 PROCEDURE — 250N000011 HC RX IP 250 OP 636: Performed by: THORACIC SURGERY (CARDIOTHORACIC VASCULAR SURGERY)

## 2023-12-07 PROCEDURE — 999N000141 HC STATISTIC PRE-PROCEDURE NURSING ASSESSMENT: Performed by: THORACIC SURGERY (CARDIOTHORACIC VASCULAR SURGERY)

## 2023-12-07 PROCEDURE — 250N000013 HC RX MED GY IP 250 OP 250 PS 637: Performed by: ANESTHESIOLOGY

## 2023-12-07 RX ORDER — OXYCODONE HYDROCHLORIDE 10 MG/1
10 TABLET ORAL
Qty: 20 TABLET | Refills: 0 | Status: SHIPPED | OUTPATIENT
Start: 2023-12-07

## 2023-12-07 RX ORDER — PROPOFOL 10 MG/ML
INJECTION, EMULSION INTRAVENOUS PRN
Status: DISCONTINUED | OUTPATIENT
Start: 2023-12-07 | End: 2023-12-07

## 2023-12-07 RX ORDER — CEFAZOLIN SODIUM/WATER 2 G/20 ML
2 SYRINGE (ML) INTRAVENOUS SEE ADMIN INSTRUCTIONS
Status: DISCONTINUED | OUTPATIENT
Start: 2023-12-07 | End: 2023-12-07 | Stop reason: HOSPADM

## 2023-12-07 RX ORDER — DEXAMETHASONE SODIUM PHOSPHATE 4 MG/ML
INJECTION, SOLUTION INTRA-ARTICULAR; INTRALESIONAL; INTRAMUSCULAR; INTRAVENOUS; SOFT TISSUE PRN
Status: DISCONTINUED | OUTPATIENT
Start: 2023-12-07 | End: 2023-12-07

## 2023-12-07 RX ORDER — HEPARIN SODIUM,PORCINE 10 UNIT/ML
5-10 VIAL (ML) INTRAVENOUS EVERY 24 HOURS
Status: DISCONTINUED | OUTPATIENT
Start: 2023-12-07 | End: 2023-12-07 | Stop reason: HOSPADM

## 2023-12-07 RX ORDER — FENTANYL CITRATE 50 UG/ML
25 INJECTION, SOLUTION INTRAMUSCULAR; INTRAVENOUS EVERY 5 MIN PRN
Status: DISCONTINUED | OUTPATIENT
Start: 2023-12-07 | End: 2023-12-07 | Stop reason: HOSPADM

## 2023-12-07 RX ORDER — BUPIVACAINE HYDROCHLORIDE AND EPINEPHRINE 2.5; 5 MG/ML; UG/ML
INJECTION, SOLUTION INFILTRATION; PERINEURAL PRN
Status: DISCONTINUED | OUTPATIENT
Start: 2023-12-07 | End: 2023-12-07 | Stop reason: HOSPADM

## 2023-12-07 RX ORDER — HYDROMORPHONE HCL IN WATER/PF 6 MG/30 ML
0.4 PATIENT CONTROLLED ANALGESIA SYRINGE INTRAVENOUS EVERY 5 MIN PRN
Status: DISCONTINUED | OUTPATIENT
Start: 2023-12-07 | End: 2023-12-07 | Stop reason: HOSPADM

## 2023-12-07 RX ORDER — SODIUM CHLORIDE, SODIUM GLUCONATE, SODIUM ACETATE, POTASSIUM CHLORIDE AND MAGNESIUM CHLORIDE 526; 502; 368; 37; 30 MG/100ML; MG/100ML; MG/100ML; MG/100ML; MG/100ML
INJECTION, SOLUTION INTRAVENOUS CONTINUOUS PRN
Status: DISCONTINUED | OUTPATIENT
Start: 2023-12-07 | End: 2023-12-07

## 2023-12-07 RX ORDER — FENTANYL CITRATE 50 UG/ML
INJECTION, SOLUTION INTRAMUSCULAR; INTRAVENOUS PRN
Status: DISCONTINUED | OUTPATIENT
Start: 2023-12-07 | End: 2023-12-07

## 2023-12-07 RX ORDER — ONDANSETRON 2 MG/ML
INJECTION INTRAMUSCULAR; INTRAVENOUS PRN
Status: DISCONTINUED | OUTPATIENT
Start: 2023-12-07 | End: 2023-12-07

## 2023-12-07 RX ORDER — ONDANSETRON 2 MG/ML
4 INJECTION INTRAMUSCULAR; INTRAVENOUS EVERY 30 MIN PRN
Status: DISCONTINUED | OUTPATIENT
Start: 2023-12-07 | End: 2023-12-07 | Stop reason: HOSPADM

## 2023-12-07 RX ORDER — OXYCODONE HYDROCHLORIDE 10 MG/1
10 TABLET ORAL
Status: DISCONTINUED | OUTPATIENT
Start: 2023-12-07 | End: 2023-12-07 | Stop reason: HOSPADM

## 2023-12-07 RX ORDER — OXYCODONE HYDROCHLORIDE 5 MG/1
5 TABLET ORAL
Status: COMPLETED | OUTPATIENT
Start: 2023-12-07 | End: 2023-12-07

## 2023-12-07 RX ORDER — ONDANSETRON 4 MG/1
4 TABLET, ORALLY DISINTEGRATING ORAL EVERY 30 MIN PRN
Status: DISCONTINUED | OUTPATIENT
Start: 2023-12-07 | End: 2023-12-07 | Stop reason: HOSPADM

## 2023-12-07 RX ORDER — LIDOCAINE 40 MG/G
CREAM TOPICAL
Status: DISCONTINUED | OUTPATIENT
Start: 2023-12-07 | End: 2023-12-07 | Stop reason: HOSPADM

## 2023-12-07 RX ORDER — LIDOCAINE HYDROCHLORIDE 20 MG/ML
INJECTION, SOLUTION INFILTRATION; PERINEURAL PRN
Status: DISCONTINUED | OUTPATIENT
Start: 2023-12-07 | End: 2023-12-07

## 2023-12-07 RX ORDER — HYDROMORPHONE HCL IN WATER/PF 6 MG/30 ML
0.2 PATIENT CONTROLLED ANALGESIA SYRINGE INTRAVENOUS EVERY 5 MIN PRN
Status: DISCONTINUED | OUTPATIENT
Start: 2023-12-07 | End: 2023-12-07 | Stop reason: HOSPADM

## 2023-12-07 RX ORDER — HEPARIN SODIUM (PORCINE) LOCK FLUSH IV SOLN 100 UNIT/ML 100 UNIT/ML
5-10 SOLUTION INTRAVENOUS
Status: DISCONTINUED | OUTPATIENT
Start: 2023-12-07 | End: 2023-12-07 | Stop reason: HOSPADM

## 2023-12-07 RX ORDER — CEFAZOLIN SODIUM/WATER 2 G/20 ML
2 SYRINGE (ML) INTRAVENOUS
Status: COMPLETED | OUTPATIENT
Start: 2023-12-07 | End: 2023-12-07

## 2023-12-07 RX ORDER — HEPARIN SODIUM,PORCINE 10 UNIT/ML
5-10 VIAL (ML) INTRAVENOUS
Status: DISCONTINUED | OUTPATIENT
Start: 2023-12-07 | End: 2023-12-07 | Stop reason: HOSPADM

## 2023-12-07 RX ORDER — SODIUM CHLORIDE, SODIUM LACTATE, POTASSIUM CHLORIDE, CALCIUM CHLORIDE 600; 310; 30; 20 MG/100ML; MG/100ML; MG/100ML; MG/100ML
INJECTION, SOLUTION INTRAVENOUS CONTINUOUS
Status: DISCONTINUED | OUTPATIENT
Start: 2023-12-07 | End: 2023-12-07 | Stop reason: HOSPADM

## 2023-12-07 RX ORDER — FENTANYL CITRATE 50 UG/ML
50 INJECTION, SOLUTION INTRAMUSCULAR; INTRAVENOUS EVERY 5 MIN PRN
Status: DISCONTINUED | OUTPATIENT
Start: 2023-12-07 | End: 2023-12-07 | Stop reason: HOSPADM

## 2023-12-07 RX ADMIN — FENTANYL CITRATE 25 MCG: 50 INJECTION, SOLUTION INTRAMUSCULAR; INTRAVENOUS at 08:44

## 2023-12-07 RX ADMIN — LIDOCAINE HYDROCHLORIDE 100 MG: 20 INJECTION, SOLUTION INFILTRATION; PERINEURAL at 07:47

## 2023-12-07 RX ADMIN — FENTANYL CITRATE 25 MCG: 50 INJECTION INTRAMUSCULAR; INTRAVENOUS at 08:19

## 2023-12-07 RX ADMIN — HEPARIN SODIUM (PORCINE) LOCK FLUSH IV SOLN 100 UNIT/ML 5 ML: 100 SOLUTION at 11:15

## 2023-12-07 RX ADMIN — OXYCODONE HYDROCHLORIDE 5 MG: 5 TABLET ORAL at 10:03

## 2023-12-07 RX ADMIN — ONDANSETRON 4 MG: 2 INJECTION INTRAMUSCULAR; INTRAVENOUS at 08:14

## 2023-12-07 RX ADMIN — FENTANYL CITRATE 50 MCG: 50 INJECTION INTRAMUSCULAR; INTRAVENOUS at 07:47

## 2023-12-07 RX ADMIN — HYDROMORPHONE HYDROCHLORIDE 0.2 MG: 0.2 INJECTION, SOLUTION INTRAMUSCULAR; INTRAVENOUS; SUBCUTANEOUS at 09:17

## 2023-12-07 RX ADMIN — DEXAMETHASONE SODIUM PHOSPHATE 4 MG: 4 INJECTION, SOLUTION INTRA-ARTICULAR; INTRALESIONAL; INTRAMUSCULAR; INTRAVENOUS; SOFT TISSUE at 08:00

## 2023-12-07 RX ADMIN — FENTANYL CITRATE 25 MCG: 50 INJECTION, SOLUTION INTRAMUSCULAR; INTRAVENOUS at 09:06

## 2023-12-07 RX ADMIN — FENTANYL CITRATE 25 MCG: 50 INJECTION, SOLUTION INTRAMUSCULAR; INTRAVENOUS at 09:11

## 2023-12-07 RX ADMIN — Medication 50 MG: at 07:47

## 2023-12-07 RX ADMIN — PROPOFOL 150 MG: 10 INJECTION, EMULSION INTRAVENOUS at 07:47

## 2023-12-07 RX ADMIN — FENTANYL CITRATE 25 MCG: 50 INJECTION, SOLUTION INTRAMUSCULAR; INTRAVENOUS at 08:53

## 2023-12-07 RX ADMIN — ANTIHEMOPHILIC FACTOR/VON WILLEBRAND FACTOR COMPLEX (HUMAN) 3812 VWF UNIT: KIT at 07:33

## 2023-12-07 RX ADMIN — FENTANYL CITRATE 25 MCG: 50 INJECTION INTRAMUSCULAR; INTRAVENOUS at 08:24

## 2023-12-07 RX ADMIN — SODIUM CHLORIDE, SODIUM GLUCONATE, SODIUM ACETATE, POTASSIUM CHLORIDE AND MAGNESIUM CHLORIDE: 526; 502; 368; 37; 30 INJECTION, SOLUTION INTRAVENOUS at 07:38

## 2023-12-07 RX ADMIN — PHENYLEPHRINE HYDROCHLORIDE 100 MCG: 10 INJECTION INTRAVENOUS at 07:50

## 2023-12-07 RX ADMIN — Medication 2 G: at 07:50

## 2023-12-07 RX ADMIN — PHENYLEPHRINE HYDROCHLORIDE 100 MCG: 10 INJECTION INTRAVENOUS at 07:49

## 2023-12-07 RX ADMIN — SUGAMMADEX 200 MG: 100 INJECTION, SOLUTION INTRAVENOUS at 08:16

## 2023-12-07 ASSESSMENT — ACTIVITIES OF DAILY LIVING (ADL)
ADLS_ACUITY_SCORE: 35
ADLS_ACUITY_SCORE: 37
ADLS_ACUITY_SCORE: 37

## 2023-12-07 NOTE — ANESTHESIA PROCEDURE NOTES
Airway       Patient location during procedure: OR       Procedure Start/Stop Times: 12/7/2023 7:50 AM  Staff -        CRNA: Wendy Allen APRN CRNA       Performed By: CRNAIndications and Patient Condition       Indications for airway management: bere-procedural       Induction type:intravenous       Mask difficulty assessment: 1 - vent by mask    Final Airway Details       Final airway type: endotracheal airway       Successful airway: ETT - single and Oral  Endotracheal Airway Details        ETT size (mm): 6.5       Cuffed: yes       Successful intubation technique: direct laryngoscopy       DL Blade Type: Goldberg 2       Grade View of Cords: 1       Adjucts: stylet       Position: Right       Measured from: lips       Secured at (cm): 20       Bite block used: None    Post intubation assessment        Placement verified by: capnometry and equal breath sounds        Number of attempts at approach: 1       Secured with: tape       Ease of procedure: easy       Dentition: Intact and Unchanged    Medication(s) Administered   Medication Administration Time: 12/7/2023 7:50 AM

## 2023-12-07 NOTE — DISCHARGE INSTRUCTIONS
Contacting your Doctor -   To contact a doctor, call Dr. Jarad Cheung at [facility] at [phone] or [phone] and ask for the resident on call for Thoracic surgery (answered 24 hours a day)   Emergency Department:  [facility]: [phone]  [facility]: [phone] 911 if you are in need of immediate or emergent help

## 2023-12-07 NOTE — ANESTHESIA CARE TRANSFER NOTE
Patient: Talya Walter    Procedure: Procedure(s):  INCISION AND DRAINAGE, WOUND, ABDOMINAL WALL       Diagnosis: Retained foreign body in soft tissue [M79.5]  Diagnosis Additional Information: No value filed.    Anesthesia Type:   General     Note:    Oropharynx: spontaneously breathing  Level of Consciousness: awake and drowsy  Oxygen Supplementation: nasal cannula    Independent Airway: airway patency satisfactory and stable  Dentition: dentition unchanged  Vital Signs Stable: post-procedure vital signs reviewed and stable  Report to RN Given: handoff report given  Patient transferred to: PACU    Handoff Report: Identifed the Patient, Identified the Reponsible Provider, Reviewed the pertinent medical history, Discussed the surgical course, Reviewed Intra-OP anesthesia mangement and issues during anesthesia, Set expectations for post-procedure period and Allowed opportunity for questions and acknowledgement of understanding      Vitals:  Vitals Value Taken Time   /63 12/07/23 0830   Temp     Pulse 65 12/07/23 0843   Resp 14 12/07/23 0843   SpO2 100 % 12/07/23 0843   Vitals shown include unfiled device data.    Electronically Signed By: OCTAVIO Hampton CRNA  December 7, 2023  8:44 AM

## 2023-12-07 NOTE — BRIEF OP NOTE
[facility]    Brief Operative Note    Pre-operative diagnosis: Retained foreign body in soft tissue [M79.5]  Post-operative diagnosis Same as pre-operative diagnosis    Procedure: INCISION AND DRAINAGE, WOUND, ABDOMINAL WALL, N/A - Abdomen    Surgeon: Surgeon(s) and Role:     * Jarad Cheung MD - Primary  Anesthesia: General   Estimated Blood Loss: Minimal    Drains: None  Specimens: * No specimens in log *  Findings:   Retained G tube bumper removed.  Complications: None.  Implants: * No implants in log *

## 2023-12-07 NOTE — ANESTHESIA POSTPROCEDURE EVALUATION
Patient: Talya Walter    Procedure: Procedure(s):  INCISION AND DRAINAGE, WOUND, ABDOMINAL WALL       Anesthesia Type:  General    Note:  Disposition: Outpatient   Postop Pain Control: Uneventful            Sign Out: Well controlled pain   PONV: No   Neuro/Psych: Uneventful            Sign Out: Acceptable/Baseline neuro status   Airway/Respiratory: Uneventful            Sign Out: Acceptable/Baseline resp. status   CV/Hemodynamics: Uneventful            Sign Out: Acceptable CV status; No obvious hypovolemia; No obvious fluid overload   Other NRE: NONE   DID A NON-ROUTINE EVENT OCCUR? No           Last vitals:  Vitals Value Taken Time   /64 12/07/23 0906   Temp     Pulse 63 12/07/23 0909   Resp 25 12/07/23 0909   SpO2 100 % 12/07/23 0909   Vitals shown include unfiled device data.    Electronically Signed By: Benito Casper MD  December 7, 2023  9:10 AM

## 2023-12-07 NOTE — OP NOTE
Preoperative diagnosis:                         Retained PEG bumper  Postoperative diagnosis:                       As above    Procedure:   INCISION AND DRAINAGE, WOUND, ABDOMINAL WALL     Anesthesia: General   Surgeon: Jarad Cheung   Resident surgeon: Urszula Ruiz MD  EBL:  Minimal    Complications: none immediate  Findings:  PEG bumper removed intact     Description of procedure    The patient was brought to the room and placed supine upon the table.  After confirming the patient's identity and verifying the consent, appropriate monitoring devices were placed as well as SCD boots.  General anesthesia was administered and the patient was intubated with a single-lumen endotracheal tube. Intravenous antibiotic was administered within one hour prior to the incision.  The patient was secured to the table while remaining supine.  The abdomen was prepped with betadine and draped in standard surgical fashion. The area was infiltrated with 0.25% marcaine and the PEG site was slightly enlarged and the bumper removed intact. There was some purulent drainage. The cavity was irrigated copiously and there was no further pus. Hemostasis was achieved with cautery. The cavity was packed with 1/2 inch plain packing gauze. The area was cleaned and dried and a sterile dressing was placed.

## 2023-12-10 NOTE — PROGRESS NOTES
THORACIC SURGERY FOLLOW UP VISIT    Dear Dr. Goldberg,  I saw Talya Watler in follow-up today. The clinical summary follows:     PREOP DIAGNOSIS   Recurrent hiatal hernia with reflux    PROCEDURE 10/18/2023  Robotic-assisted redo laparoscopic repair of hiatal hernia  EGD  Gastrostomy  Left  chest tube insertion  Lysis of adhesions for greater than 45 minutes    PROCEDURE 12/7/2023    Incision and drainage woun, abdominal wall to remove PEG bumper     COMPLICATIONS  Rectus sheath hematoma       BP (!) 142/82   Pulse 85   Resp 16   Wt 77.1 kg (170 lb)   SpO2 99%   BMI 27.44 kg/m     Physical Exam  Constitutional:       Appearance: Normal appearance.   Cardiovascular:      Rate and Rhythm: Normal rate.   Pulmonary:      Effort: Pulmonary effort is normal.   Abdominal:      Palpations: Abdomen is soft.      Comments: Former PEG site clean and closing  Skin:     General: Skin is warm and dry.   Neurological:      Mental Status: She is alert and oriented to person, place, and time.   Psychiatric:         Mood and Affect: Mood normal.         Behavior: Behavior normal.         Thought Content: Thought content normal.         Judgment: Judgment normal.            SUBJECTIVE  Ms. Walter is doing much better since the PEG bumper was removed. She is able to sleep better and the pain is much less. She is showering daily.        IMPRESSION (K44.9,  K21.9) Hiatal hernia with gastroesophageal reflux sp redo hiatal hernia repair 10/18/2023  (primary encounter diagnosis)     This patient is a 78 year old woman who is doing well after undergoing a redo hiatal hernia repair on 10/18/2023 and removal of the PEG bumper on 10/18/2023. This was complicated by a rectus sheath hematoma.      PLAN  I spent 10 min on the date of the encounter in chart review, patient visit, review of tests, documentation and/or discussion with other providers about the issues documented above. I reviewed the plan as follows:    Follow up in 1 year with  esophagram.    She may take a bath/immerse her abdomen once the old PEG site is sealed.       All questions were answered and the patient and present family were in agreement with the plan.    I appreciate the opportunity to participate in the care of your patient and will keep you updated.    Sincerely,         Jarad Cheung MD

## 2023-12-11 ENCOUNTER — ONCOLOGY VISIT (OUTPATIENT)
Dept: ONCOLOGY | Facility: CLINIC | Age: 78
End: 2023-12-11
Attending: THORACIC SURGERY (CARDIOTHORACIC VASCULAR SURGERY)
Payer: MEDICARE

## 2023-12-11 VITALS
DIASTOLIC BLOOD PRESSURE: 82 MMHG | BODY MASS INDEX: 27.44 KG/M2 | RESPIRATION RATE: 16 BRPM | OXYGEN SATURATION: 99 % | SYSTOLIC BLOOD PRESSURE: 142 MMHG | WEIGHT: 170 LBS | HEART RATE: 85 BPM

## 2023-12-11 DIAGNOSIS — K44.9 HIATAL HERNIA WITH GASTROESOPHAGEAL REFLUX: ICD-10-CM

## 2023-12-11 DIAGNOSIS — Z43.1 ENCOUNTER FOR PEG (PERCUTANEOUS ENDOSCOPIC GASTROSTOMY) (H): Primary | ICD-10-CM

## 2023-12-11 DIAGNOSIS — K21.9 HIATAL HERNIA WITH GASTROESOPHAGEAL REFLUX: ICD-10-CM

## 2023-12-11 PROCEDURE — 99024 POSTOP FOLLOW-UP VISIT: CPT | Performed by: THORACIC SURGERY (CARDIOTHORACIC VASCULAR SURGERY)

## 2023-12-11 PROCEDURE — G0463 HOSPITAL OUTPT CLINIC VISIT: HCPCS | Performed by: THORACIC SURGERY (CARDIOTHORACIC VASCULAR SURGERY)

## 2023-12-11 ASSESSMENT — PAIN SCALES - GENERAL: PAINLEVEL: MILD PAIN (2)

## 2023-12-11 NOTE — LETTER
12/11/2023         RE: Talya Walter  [address]  [facility] [place] [facility] [address]        Dear Colleague,    Thank you for referring your patient, Talya Walter, to the [facility]. Please see a copy of my visit note below.    THORACIC SURGERY FOLLOW UP VISIT    Dear Dr. Goldberg,  I saw Talya Walter in follow-up today. The clinical summary follows:     PREOP DIAGNOSIS   Recurrent hiatal hernia with reflux    PROCEDURE 10/18/2023  Robotic-assisted redo laparoscopic repair of hiatal hernia  EGD  Gastrostomy  Left  chest tube insertion  Lysis of adhesions for greater than 45 minutes    PROCEDURE 12/7/2023    Incision and drainage woun, abdominal wall to remove PEG bumper     COMPLICATIONS  Rectus sheath hematoma       BP (!) 142/82   Pulse 85   Resp 16   Wt 77.1 kg (170 lb)   SpO2 99%   BMI 27.44 kg/m     Physical Exam  Constitutional:       Appearance: Normal appearance.   Cardiovascular:      Rate and Rhythm: Normal rate.   Pulmonary:      Effort: Pulmonary effort is normal.   Abdominal:      Palpations: Abdomen is soft.      Comments: Former PEG site clean and closing  Skin:     General: Skin is warm and dry.   Neurological:      Mental Status: She is alert and oriented to person, place, and time.   Psychiatric:         Mood and Affect: Mood normal.         Behavior: Behavior normal.         Thought Content: Thought content normal.         Judgment: Judgment normal.            SUBJECTIVE  Ms. Walter is doing much better since the PEG bumper was removed. She is able to sleep better and the pain is much less. She is showering daily.        IMPRESSION (K44.9,  K21.9) Hiatal hernia with gastroesophageal reflux sp redo hiatal hernia repair 10/18/2023  (primary encounter diagnosis)     This patient is a 78 year old woman who is doing well after undergoing a redo hiatal hernia repair on 10/18/2023 and removal of the PEG bumper on 10/18/2023. This was complicated by a rectus sheath  hematoma.      PLAN  I spent 10 min on the date of the encounter in chart review, patient visit, review of tests, documentation and/or discussion with other providers about the issues documented above. I reviewed the plan as follows:    Follow up in 1 year with esophagram.    She may take a bath/immerse her abdomen once the old PEG site is sealed.       All questions were answered and the patient and present family were in agreement with the plan.    I appreciate the opportunity to participate in the care of your patient and will keep you updated.    Sincerely,         Jarad Cheung MD       Again, thank you for allowing me to participate in the care of your patient.        Sincerely,        Jarad Cheung MD

## 2024-05-21 ENCOUNTER — PATIENT OUTREACH (OUTPATIENT)
Dept: ONCOLOGY | Facility: CLINIC | Age: 79
End: 2024-05-21
Payer: MEDICARE

## 2024-05-21 NOTE — PROGRESS NOTES
Scheduling notified me of sooner appt made for patient d/t symptoms   S/p 10/2023 Robot-assisted laparoscopic hiatal hernia repair  Called patient, she is feeling poorly - lots of acid reflux/nausea/vomiting.   She basically is limited to only eating certain foods . She has eliminated all triggers (coffee, red wine, citrus, etc )   She is taking Sucralfate 4x/day, protonix 80mg BID, and tums PRN  She has been experiencing these symptoms for at least 1 month.   Esophogram and f/up with Skye scheduled next 1-2 week.     Arabella Stuart, RN, BSN  Specialty Care Coordinator  [facility]  [phone]

## 2024-05-30 ENCOUNTER — HOSPITAL ENCOUNTER (OUTPATIENT)
Dept: GENERAL RADIOLOGY | Facility: CLINIC | Age: 79
Discharge: HOME | End: 2024-05-30
Attending: THORACIC SURGERY (CARDIOTHORACIC VASCULAR SURGERY) | Admitting: THORACIC SURGERY (CARDIOTHORACIC VASCULAR SURGERY)
Payer: MEDICARE

## 2024-05-30 DIAGNOSIS — K21.9 HIATAL HERNIA WITH GASTROESOPHAGEAL REFLUX: ICD-10-CM

## 2024-05-30 DIAGNOSIS — K44.9 HIATAL HERNIA WITH GASTROESOPHAGEAL REFLUX: ICD-10-CM

## 2024-05-30 PROCEDURE — 74240 X-RAY XM UPR GI TRC 1CNTRST: CPT

## 2024-06-03 ENCOUNTER — ONCOLOGY VISIT (OUTPATIENT)
Dept: ONCOLOGY | Facility: CLINIC | Age: 79
End: 2024-06-03
Attending: THORACIC SURGERY (CARDIOTHORACIC VASCULAR SURGERY)
Payer: MEDICARE

## 2024-06-03 VITALS
SYSTOLIC BLOOD PRESSURE: 126 MMHG | DIASTOLIC BLOOD PRESSURE: 78 MMHG | OXYGEN SATURATION: 96 % | TEMPERATURE: 98.3 F | RESPIRATION RATE: 16 BRPM | WEIGHT: 169.6 LBS | BODY MASS INDEX: 27.37 KG/M2 | HEART RATE: 98 BPM

## 2024-06-03 DIAGNOSIS — K44.9 HIATAL HERNIA WITH GASTROESOPHAGEAL REFLUX: Primary | ICD-10-CM

## 2024-06-03 DIAGNOSIS — K21.9 HIATAL HERNIA WITH GASTROESOPHAGEAL REFLUX: Primary | ICD-10-CM

## 2024-06-03 PROCEDURE — 99213 OFFICE O/P EST LOW 20 MIN: CPT | Performed by: THORACIC SURGERY (CARDIOTHORACIC VASCULAR SURGERY)

## 2024-06-03 PROCEDURE — G0463 HOSPITAL OUTPT CLINIC VISIT: HCPCS | Performed by: THORACIC SURGERY (CARDIOTHORACIC VASCULAR SURGERY)

## 2024-06-03 ASSESSMENT — PAIN SCALES - GENERAL: PAINLEVEL: NO PAIN (0)

## 2024-06-03 NOTE — PROGRESS NOTES
THORACIC SURGERY FOLLOW UP VISIT     Dear Dr. Goldberg,    I saw Talya Walter in follow-up today. The clinical summary follows:     PREOP DIAGNOSIS   Recurrent hiatal hernia with reflux     PROCEDURE 10/18/2023  Robotic-assisted redo laparoscopic repair of hiatal hernia  EGD  Gastrostomy  Left  chest tube insertion  Lysis of adhesions for greater than 45 minutes     PROCEDURE 12/7/2023               Incision and drainage wound, abdominal wall to remove PEG bumper     COMPLICATIONS  Rectus sheath hematoma     INTERVAL STUDIES  Esophagram 5/30/2024.Small sliding hiatal hernia, large gastroesophageal reflux        Vitals   Physical Exam  Constitutional:       Appearance: Normal appearance.   Cardiovascular:      Rate and Rhythm: Normal rate.   Pulmonary:      Effort: Pulmonary effort is normal.   Abdominal:      Palpations: Abdomen is soft.   Skin:     General: Skin is warm and dry.   Neurological:      Mental Status: She is alert and oriented to person, place, and time.   Psychiatric:         Mood and Affect: Mood normal.         Behavior: Behavior normal.         Thought Content: Thought content normal.         Judgment: Judgment normal.            SUBJECTIVE  Ms. Walter is having days with severe reflux and then protonix twice daily, sucralfate and TUMS can sometimes help. There can be several days in a row and then many good days. She cannot relate good or bad days to activity. Acidic foods exacerbate her symptoms. She also has been having dark, tarry stools and some dizziness. She has bowel movements 3-4 times daily.         IMPRESSION (K44.9,  K21.9) Hiatal hernia with gastroesophageal reflux sp redo hiatal hernia repair 10/18/2023  (primary encounter diagnosis)     This patient is a 78 year old woman who is doing well after undergoing a (redo laparoscopy) hiatal hernia repair on 10/18/2023 and removal of the PEG bumper on 10/18/2023. This was complicated by a rectus sheath hematoma. She is having reflux  symptoms, at times severe. She also has dark, tarry stool and dizziness.      PLAN  I spent 20 min on the date of the encounter in chart review, patient visit, review of tests, documentation and/or discussion with other providers about the issues documented above. I reviewed the plan as follows:     I recommend GI evaluation for possible bleeding. She feels mainly well, so I did not refer her to the emergency department.    I recommend adding bedtime Pepcid on the bad acid days.  If her reflux symptoms continue to bother her or she doesn't want to experience them anymore, I offered a partial fundoplication. She will decide after the GI workup.       All questions were answered and the patient and present family were in agreement with the plan.     I appreciate the opportunity to participate in the care of your patient and will keep you updated.     Sincerely,         Jarad Cheung MD

## 2024-06-03 NOTE — LETTER
6/3/2024         RE: Talya Walter  [address]  [facility] [address]        Dear Colleague,    Thank you for referring your patient, Talya Walter, to the [facility]. Please see a copy of my visit note below.    THORACIC SURGERY FOLLOW UP VISIT     Dear Dr. Goldberg,    I saw Talya Walter in follow-up today. The clinical summary follows:     PREOP DIAGNOSIS   Recurrent hiatal hernia with reflux     PROCEDURE 10/18/2023  Robotic-assisted redo laparoscopic repair of hiatal hernia  EGD  Gastrostomy  Left  chest tube insertion  Lysis of adhesions for greater than 45 minutes     PROCEDURE 12/7/2023               Incision and drainage wound, abdominal wall to remove PEG bumper     COMPLICATIONS  Rectus sheath hematoma     INTERVAL STUDIES  Esophagram 5/30/2024.Small sliding hiatal hernia, large gastroesophageal reflux        Vitals   Physical Exam  Constitutional:       Appearance: Normal appearance.   Cardiovascular:      Rate and Rhythm: Normal rate.   Pulmonary:      Effort: Pulmonary effort is normal.   Abdominal:      Palpations: Abdomen is soft.   Skin:     General: Skin is warm and dry.   Neurological:      Mental Status: She is alert and oriented to person, place, and time.   Psychiatric:         Mood and Affect: Mood normal.         Behavior: Behavior normal.         Thought Content: Thought content normal.         Judgment: Judgment normal.            SUBJECTIVE  Ms. Walter is having days with severe reflux and then protonix twice daily, sucralfate and TUMS can sometimes help. There can be several days in a row and then many good days. She cannot relate good or bad days to activity. Acidic foods exacerbate her symptoms. She also has been having dark, tarry stools and some dizziness. She has bowel movements 3-4 times daily.         IMPRESSION (K44.9,  K21.9) Hiatal hernia with gastroesophageal reflux sp redo hiatal hernia repair 10/18/2023  (primary encounter  "diagnosis)     This patient is a 78 year old woman who is doing well after undergoing a (redo laparoscopy) hiatal hernia repair on 10/18/2023 and removal of the PEG bumper on 10/18/2023. This was complicated by a rectus sheath hematoma. She is having reflux symptoms, at times severe. She also has dark, tarry stool and dizziness.      PLAN  I spent 20 min on the date of the encounter in chart review, patient visit, review of tests, documentation and/or discussion with other providers about the issues documented above. I reviewed the plan as follows:     I recommend GI evaluation for possible bleeding. She feels mainly well, so I did not refer her to the emergency department.    I recommend adding bedtime Pepcid on the bad acid days.  If her reflux symptoms continue to bother her or she doesn't want to experience them anymore, I offered a partial fundoplication. She will decide after the GI workup.       All questions were answered and the patient and present family were in agreement with the plan.     I appreciate the opportunity to participate in the care of your patient and will keep you updated.     Sincerely,         Jarad Cheung MD     Oncology Rooming Note    Nicki 3, 2024 3:00 PM   Talya Walter is a 78 year old female who presents for:    Chief Complaint   Patient presents with     Oncology Clinic Visit     Von Willebrand's disease (H)  Hiatal hernia with gastroesophageal reflux sp redo hiatal hernia repair 10/18/2023       Initial Vitals: /78 (BP Location: Right arm, Patient Position: Sitting, Cuff Size: Adult Large)   Pulse 98   Temp 98.3  F (36.8  C) (Oral)   Resp 16   Wt 76.9 kg (169 lb 9.6 oz)   SpO2 96%   BMI 27.37 kg/m   Estimated body mass index is 27.37 kg/m  as calculated from the following:    Height as of 12/7/23: 1.676 m (5' 6\").    Weight as of this encounter: 76.9 kg (169 lb 9.6 oz). Body surface area is 1.89 meters squared.  No Pain (0) Comment: Data Unavailable   No LMP " recorded. Patient is perimenopausal.  Allergies reviewed: Yes  Medications reviewed: Yes    Medications: Medication refills not needed today.  Pharmacy name entered into [employer] [facility]: [id number] - [place], [place] - [address]    Frailty Screening:   Is the patient here for a new oncology consult visit in cancer care? 2. No      Clinical concerns: no        Francheska Zendejas CMA                Again, thank you for allowing me to participate in the care of your patient.        Sincerely,        Jarad Cheung MD

## 2024-06-03 NOTE — PROGRESS NOTES
"Oncology Rooming Note    Nicki 3, 2024 3:00 PM   Talya Walter is a 78 year old female who presents for:    Chief Complaint   Patient presents with    Oncology Clinic Visit     Von Willebrand's disease (H)  Hiatal hernia with gastroesophageal reflux sp redo hiatal hernia repair 10/18/2023       Initial Vitals: /78 (BP Location: Right arm, Patient Position: Sitting, Cuff Size: Adult Large)   Pulse 98   Temp 98.3  F (36.8  C) (Oral)   Resp 16   Wt 76.9 kg (169 lb 9.6 oz)   SpO2 96%   BMI 27.37 kg/m   Estimated body mass index is 27.37 kg/m  as calculated from the following:    Height as of 12/7/23: 1.676 m (5' 6\").    Weight as of this encounter: 76.9 kg (169 lb 9.6 oz). Body surface area is 1.89 meters squared.  No Pain (0) Comment: Data Unavailable   No LMP recorded. Patient is perimenopausal.  Allergies reviewed: Yes  Medications reviewed: Yes    Medications: Medication refills not needed today.  Pharmacy name entered into [employer]: [id number] - [place], [place] - [address]    Frailty Screening:   Is the patient here for a new oncology consult visit in cancer care? 2. No      Clinical concerns: no        Francheska Zendejas CMA              "

## 2024-06-18 ENCOUNTER — PATIENT OUTREACH (OUTPATIENT)
Dept: SURGERY | Facility: CLINIC | Age: 79
End: 2024-06-18
Payer: MEDICARE

## 2024-06-18 NOTE — PROGRESS NOTES
Spoke with Talya, she has made an appointment with GI at [facility]. She prefers [facility] as she has had multiple surgeries done there and they have all her records. Her appointment is on October 3rd, she stated her heartburn is really not that bad, if it does get bad again she will contact her PCP and try to have them move up her appointment. No further questions or concerns.

## 2024-10-18 NOTE — PROVIDER NOTIFICATION
"Writer victorino DONOVAN \"[facility]CO Walter [address] Pt incision on L side is actively bleeding- can you come see pt at bedside ASAP before she goes down to [facility]? thanks! [place]n [id number]\"  " Established Patient - TeleHealth Visit    The patient location is: [place]- car  The chief complaint leading to consultation is: chronic pain     Visit type: audiovisual    Face to Face time with patient: 10-15 minutes  20 minutes of total time spent on the encounter, which includes face to face time and non-face to face time preparing to see the patient (eg, review of tests), Obtaining and/or reviewing separately obtained history, Documenting clinical information in the electronic or other health record, Independently interpreting results (not separately reported) and communicating results to the patient/family/caregiver, or Care coordination (not separately reported).     Each patient to whom he or she provides medical services by telemedicine is:  (1) informed of the relationship between the physician and patient and the respective role of any other health care provider with respect to management of the patient; and (2) notified that he or she may decline to receive medical services by telemedicine and may withdraw from such care at any time.      Chronic Pain -- Established Patient (Follow-up visit)  Chief Pain Complaint:  Chief Complaint   Patient presents with    Follow-up     Knee pain, L>R    Interval History (10/18/2024):  Cee Mora presents today for follow-up visit.  Patient was last seen on 9/6/2024. At that visit, the plan was to increase gabapentin capsule 300mg to QID.  She was recently told that her kidney function has decreased, so her PCP has either decrease doses of medications or taking her off altogether.  She reduced Gabapentin (Neurontin) 300mg down to BID.  She continues swimming and losing weight.  She is down to 314 lb.    Interval History (9/6/2024): Patient was last seen about 3 weeks ago, on 8/15/2024. she presents today for follow-up for medication refill. she feels the pain medication is providing adequate pain relief and reduces the negative effects of chronic pain that affects  quality of life, but she feels that she would benefit from increasing gabapentin and muscle relaxer. No major SE from medications.   Patient reports pain as 6/10 today.   She has been swimming, which has been helping.    Interval History (8/15/2024): Cee Mora was last seen about 8-months ago.  She presents today reporting that she has lost a lot of weight. She has not been taking gabapentin because she had to crush the pills because she couldn't swallow them. She is now on gabapentin (Neurontin) 300mg BID, which she would like to Increase to TID; she doesn't want to go back to original dose of gabapentin (Neurontin) 600mg QID though.  When she was released after stomach surgery, she was given Flexeril 10mg, which has been helping - better than Robaxin (methocarbamol).  She continues to lose weight. Her weight is down to 326#.  She will join a pool exercise club next week. She is still in the wheelchair and is hoping to be out of it soon. She is no longer on eliquis post op. She is trying to take less ibuprofen, and PCP gave her Celebrex to take instead so she could take less ibuprofen. She is off of hypertension medications. She also is taking less diabetic medications.             Interval History (12/1/2023): Patient was last seen on 6/29/2023. she presents today for follow-up for medication refill. she feels the gabapentin (Neurontin) is providing adequate pain relief and reduces the negative effects of chronic pain that affects quality of life. No major SE from medications. Colder weather Increases pain. Patient reports pain as 5/10 today.     Interval History (6/29/2023):  Cee Mora presents today for follow-up visit.  Patient was last seen about 6 months ago. she feels the medication is providing adequate pain relief and reduces the negative effects of chronic pain that affects quality of life. No major SE from medications. No major changes since previous visit; patient is stable overall.  "Patient reports pain as 7/10 today. She had some dental work recently, and she was taking ibuprofen 800mg TID, which helped with pain.    Interval History (2/3/2023):  patient presents today for follow-up visit.  Patient was last seen on 8/10/2022.  she feels the medication is providing adequate pain relief and reduces the negative effects of chronic pain that affects quality of life. No major SE from medications. No major changes since previous visit; patient is stable overall. Patient reports pain as 4/10 today. Knee pain is starting to return. She is using cream for knee and shoulder pain, which is helping. She is not ready to repeat knee injection yet. She states she is back on "diet pill" - Adipex. She will follow-up with GI next month. She continues to try to lose weight. Current weight around 480#.    Interval History (8/10/2022):  Cee Mora presents today for follow-up visit.  Patient was last seen about 6 months ago. Patient reports pain as 5/10 today. Left knee started to bother her, but not to level that it was before. Seeing GI now due to potential gastroparesis - diagnosed in ER. She is awaiting gastric sleeve but has to get this taken care of before. She has lost 100 lb since this journey.    Interval History (2/4/2022): Cee Mora presents today for follow-up visit.  she underwent bilateral knee injection on 10/21/21.  The patient reports that she is/was better following the procedure.  she reports 95% pain relief.  The changes lasted 3 weeks for left knee pain, but still better than it was prior to injection.  The changes have continued through this visit.  Patient reports pain as 6/10 today. Overall, pain significantly improved.     Interval History (8/4/2021):  Cee Mora presents today on telemedicine visit.  She was seen about 6 months ago.  Her knee pain has been worsening lately. She has never had prior treatment for knee pain.  She saw a dietician on 7/19/21, and she has " been eating much healthier lately. Patient reports pain as 6/10 today.    Interval History (1/28/2021): Patient was last seen 6 months ago. She reports her low back pain has been more bothersome lately, which she feels is due to weather change. She has continued to try to lose weight and is hoping to have bariatric surgery soon.  She has gone down from 535 to 389lb in 1.5-2 years.   Her BMI has reduced from 99 --> 71. Patient reports pain as 6/10 today.  Gabapentin is providing adequate pain relief.     History of Present Illness:   Cee Mroa is a 61 y.o. female  who is presenting with a chief complaint of Back Pain. The patient began experiencing this problem insidiously, and the pain has been gradually worsening. The pain is described as throbbing, cramping, aching and heavy and is located in the bilateral lumbar spine  . Pain is intermittent and lasts hours. The  pain is nonradiating. The patient rates her pain a 7 out of ten and interferes with activities of daily living a 6 out of ten. Pain is exacerbated by prolonged standing, and is improved by rest. Patient reports prior trauma, no prior spinal surgery     - pertinent negatives: No fever, No chills, No weight loss, No bladder dysfunction, No bowel dysfunction, No saddle anesthesia  - pertinent positives: generalized nonspecific Lower Extremity weakness bilaterally    - medications, other therapies tried (physical therapy, injections):     >> NSAIDs, Tylenol, Tramadol, Norco, Percocet, morphine, gabapentin and flexeril    >> Has previously undergone Physical Therapy    >>  Injections:     - bilateral knee injection on 10/21/21 with 95% pain relief -- pain started to return about 3 weeks post for left knee, but still reporting pain relief 3 months post      Imaging/ Diagnostic Studies/ Labs (Reviewed on 10/18/2024):    Results for orders placed during the hospital encounter of 02/05/19   X-Ray Lumbar Complete With Flex And Ext    Narrative  "COMPARISON:  None.  FINDINGS:  There is mild rightward convexity of the lumbar spine.  Transitional lumbosacral anatomy noted with partial sacralization of L5.  Vertebral body heights are well maintained.  There is slight grade 1 anterolisthesis of L4 on L5 which appears minimally worsened with flexion.  There is mild-to-moderate disc height loss throughout the lumbar spine with relative sparing of L3-4.  Prominent multilevel degenerative endplate spurring noted.  Multilevel facet arthropathy is present in the lower lumbar spine.  No pars defects visualized.  Posterior elements appear grossly intact. No acute fractures demonstrated.       Review of Systems:  CONSTITUTIONAL: patient denies any fever, chills, or weight loss  SKIN: patient denies any rash or itching  RESPIRATORY: patient denies having any shortness of breath  GASTROINTESTINAL: patient denies having any diarrhea, constipation, or bowel incontinence  GENITOURINARY: patient denies having any abnormal bladder function    MUSCULOSKELETAL:  - patient complains of the above noted pain/s (see chief pain complaint)    NEUROLOGICAL:   - pain as above  - strength in Lower extremities is intact, BILATERALLY  - sensation in Lower extremities is intact, BILATERALLY  - patient denies any loss of bowel or bladder control      PSYCHIATRIC: patient denies any change in mood    Other:  All other systems reviewed and are negative        Telemedicine Physical Exam:   Vitals:    10/18/24 0837   Weight: (!) 142.4 kg (314 lb)  Comment: pt reported   Height: 5' 2" (1.575 m)  Comment: pt reported     Body mass index is 57.43 kg/m².   (reviewed on 10/18/2024)     GENERAL: Well appearing, in no acute distress, alert and oriented x3.  Cooperative.  PSYCH:  Mood and affect appropriate.  SKIN: Skin color & texture with no obvious abnormalities.    HEAD/FACE:  Normocephalic, atraumatic.    PULM:  No difficulty breathing. No nasal flaring. No obvious wheezing.  EXTREMITIES: No obvious " deformities. Moving all extremities well, appears to have symmetric strength throughout.  MUSCULOSKELETAL: No obvious atrophy abnormalities are noted.   NEURO: No obvious neurologic deficit.   GAIT: sitting.     Physical Exam: last in clinic visit:  General: Alert and oriented, in no apparent distress.  Gait: antalgic gait.  Skin: No rashes, No discoloration, No obvious lesions  HEENT: Normocephalic, atraumatic. Pupils equal and round.  Cardiovascular: Regular rate and rhythm , no significant peripheral edema present  Respiratory: Without audible wheezing, without use of accessory muscles of respiration.    Musculoskeletal:  Lumbar Spine  - Pain on flexion of lumbar spine Absent  - Straight Leg Raise:  Absent  - Pain on extension of lumbar spine Present  - TTP over the lumbar facet joints Present L5-S1   - Lumbar facet loading Present  -Pain on palpation over the SI joint  Absent  - CEM: Absent    Neuro:  Strength:  LE R/L: HF: 5/5, HE: 5/5, KF: 5/5; KE: 5/5; FE: 5/5; FF: 5/5  Extremity Reflexes: Brisk and symmetric throughout.  Extremity Sensory: Sensation to pinprick and temperature symmetric. Proprioception intact.  Psych:  Mood and affect is appropriate          Assessment:  Cee Mora is a 61 y.o. year old female who is presenting with       ICD-10-CM ICD-9-CM    1. Diabetic polyneuropathy associated with type 2 diabetes mellitus  E11.42 250.60      357.2       2. Chronic pain of both knees  M25.561 719.46     M25.562 338.29     G89.29        3. Primary osteoarthritis of both knees  M17.0 715.16       4. Muscle pain  M79.10 729.1       5. Lumbar spondylosis  M47.816 721.3               Plan:  1. Interventional:   - Consider QUTENZA® (capsaicin) 8% topical system for diabetic peripheral neuropathy (DPN) of the feet.  - S/p bilateral knee injection on 10/21/21 with 95% pain relief -- pain started to return about 3 weeks post for left knee, but still reporting pain relief 3 months post.      2.  Pharmacologic:   - Refill gabapentin (Neurontin) 300mg BID (recently reduced from QID by PCP).   - Refill Flexeril (cyclobenzaprine) 10mg TID-QID PRN muscle spasms (or can take 1/2 tablet). Patient understands this may cause drowsiness.   - Continue compound cream (could be varying components includin.5% nabumetone, 2.5% gabapentin, 2.5% lidocaine, 2.5% prilocaine - depending on insurance coverage) for potential topical pain relief. Patent will be contacted by [employer] regarding cost and payment.    - Continue Celebrex 100mg PRN -  from PCP.     - [place]  reviewed and appropriate.       3. Rehabilitative: Encouraged regular exercise. Continue exercises and activities as tolerated. Continue weight loss journey and water exercises.    4. Diagnostic/ Imaging: No new imaging ordered. Previous imaging reviewed.     5. Consult/ Referral:   - Continue follow-up with Bariatrics/ Gen Surgery regarding hernia repair and consideration of 2nd part of gastric sleeve.    - Continue follow-up with PCP regarding recent kidney function concerns.      6. Follow up: medication Refill - virtual visit     No future appointments.      - This condition does not require this patient to take time off of work, and the primary goal of our Pain Management services is to improve the patient's functional capacity.   - I discussed the risks, benefits, and alternatives to potential treatment options. All questions and concerns were fully addressed today in clinic.         Ebony Gonzalez PA-C  Interventional Pain Management - [facility]    Disclaimer:  This note was prepared using voice recognition system and is likely to have sound alike errors that may have been overlooked even after proof reading.  Please call me with any questions.

## 2024-12-22 ENCOUNTER — HEALTH MAINTENANCE LETTER (OUTPATIENT)
Age: 79
End: 2024-12-22

## (undated) DEVICE — SURGICEL ABSORBABLE HEMOSTAT SNOW 4"X4" 2083

## (undated) DEVICE — SUCTION MANIFOLD NEPTUNE 2 SYS 4 PORT 0702-020-000

## (undated) DEVICE — ESU GROUND PAD ADULT REM W/15' CORD E7507DB

## (undated) DEVICE — DRAPE SHEET MED 44X70" 9355

## (undated) DEVICE — CLIP HORIZON SM RED WIDE SLOT 001201

## (undated) DEVICE — Device

## (undated) DEVICE — SU MONOCRYL 4-0 PS-2 27" UND Y426H

## (undated) DEVICE — ENDO SEALING CAP SMARTCAP

## (undated) DEVICE — LINEN TOWEL PACK X6 WHITE 5487

## (undated) DEVICE — KIT ENDO FIRST STEP DISINFECTANT 200ML W/POUCH EP-4

## (undated) DEVICE — SU SILK 4-0 TIE 12X30" A303H

## (undated) DEVICE — PACK NEURO MINOR UMMC SNE32MNMU4

## (undated) DEVICE — TUBING SUCTION 10'X3/16" N510

## (undated) DEVICE — SYR 10ML LL W/O NDL 302995

## (undated) DEVICE — WIPES FOLEY CARE SURESTEP PROVON DFC100

## (undated) DEVICE — CLIP HORIZON MED BLUE 002200

## (undated) DEVICE — PREP CHLORAPREP 26ML TINTED HI-LITE ORANGE 930815

## (undated) DEVICE — ENDO POUCH UNIVERSAL RETRIEVAL SYSTEM INZII 5MM CD003

## (undated) DEVICE — DAVINCI XI OBTURATOR BLADELESS 8MM 470359

## (undated) DEVICE — ENDO DISSECTOR KITTNER CIGARETTE ROLL4"X4" 15505/25

## (undated) DEVICE — TIES BANDING T50R

## (undated) DEVICE — SU VICRYL 2-0 SH 27" UND J417H

## (undated) DEVICE — DAVINCI XI FCP CADIERE 8MM ENDOWRIST 471049

## (undated) DEVICE — DRSG PRIMAPORE 02X3" 7133

## (undated) DEVICE — SOL WATER IRRIG 1000ML BOTTLE 2F7114

## (undated) DEVICE — PACKING NUGAUZE 1/2" PLAIN 7632

## (undated) DEVICE — DAVINCI XI SEAL UNIVERSAL 5-8MM 470361

## (undated) DEVICE — SU PLEDGET SOFT TFE 13MMX7MMX1.5MM D7044

## (undated) DEVICE — DAVINCI XI TISSUE SEALER SYNCROSEAL 8MM 480440

## (undated) DEVICE — SU SILK 2-0 TIE 12X30" A305H

## (undated) DEVICE — ENDO TROCAR SLEEVE KII Z-THREADED 05X100MM CTS02

## (undated) DEVICE — SOL NACL 0.9% IRRIG 1000ML BOTTLE 2F7124

## (undated) DEVICE — SUCTION DRY CHEST DRAIN OASIS 3600-100

## (undated) DEVICE — SU SILK 2-0 SH 30" K833H

## (undated) DEVICE — TUBING SUCTION MEDI-VAC SOFT 3/16"X20' N520A

## (undated) DEVICE — ENDO TUBING CO2 SMARTCAP STERILE DISP 100145CO2EXT

## (undated) DEVICE — DAVINCI XI DRAPE COLUMN 470341

## (undated) DEVICE — SYSTEM LAPAROVUE VISIBILITY LAPVUE10

## (undated) DEVICE — DRAPE SHEET REV FOLD 3/4 9349

## (undated) DEVICE — CATH TRAY FOLEY SURESTEP 16FR W/URNE MTR STLK LATEX A303316A

## (undated) DEVICE — LINEN TOWEL PACK X30 5481

## (undated) DEVICE — SPONGE LAP 18X18" X8435

## (undated) DEVICE — ENDO SYSTEM WATER BOTTLE & TUBING W/CO2 FILTER 00711549

## (undated) DEVICE — GOWN IMPERVIOUS BREATHABLE SMART XLG 89045

## (undated) DEVICE — ESU LIGASURE MARYLAND LAPAROSCOPIC SLR/DVDR 5MMX37CM LF1937

## (undated) DEVICE — ESU PENCIL W/ROCKER SWITCH BLADE HOLSTER E2350HDB

## (undated) DEVICE — ENDOVIVE STANDARD PEG KIT

## (undated) DEVICE — LINEN TOWEL PACK X5 5464

## (undated) DEVICE — SU TICRON 2 GS-21DA 36" 3146-81

## (undated) DEVICE — DRSG GAUZE 2X2" 8042

## (undated) DEVICE — DRSG STERI STRIP 1/2X4" R1547

## (undated) DEVICE — ENDO TROCAR FIRST ENTRY KII FIOS Z-THRD 05X100MM CTF03

## (undated) DEVICE — GLOVE BIOGEL PI MICRO SZ 7.5 48575

## (undated) DEVICE — DRAPE IOBAN INCISE 23X17" 6650EZ

## (undated) DEVICE — LINEN GOWN XLG 5407

## (undated) DEVICE — SU SILK 3-0 TIE 12X30" A304H

## (undated) DEVICE — DAVINCI XI DRIVER NDL MEGA SUTURECUT 8MM EXT 471309

## (undated) DEVICE — ENDO TROCAR CONMED AIRSEAL BLADELESS 12X120MM IAS12-120LP

## (undated) DEVICE — TUBING CONMED AIRSEAL SMOKE EVAC INSUFFLATION ASM-EVAC

## (undated) DEVICE — DAVINCI XI DRAPE ARM 470015

## (undated) DEVICE — CLIP HORIZON LG ORANGE 004200

## (undated) DEVICE — SOL ADH LIQUID BENZOIN SWAB 0.6ML C1544

## (undated) DEVICE — SU SILK 0 SH 30" K834H

## (undated) DEVICE — TRAY PNEUMOTHORAX G12032 C-UTPTY-1400-WAYNE-112497

## (undated) DEVICE — ESU ELEC BLADE 2.75" COATED/INSULATED E1455

## (undated) DEVICE — NDL INSUFFLATION 13GA 120MM C2201

## (undated) RX ORDER — SODIUM CHLORIDE, SODIUM LACTATE, POTASSIUM CHLORIDE, CALCIUM CHLORIDE 600; 310; 30; 20 MG/100ML; MG/100ML; MG/100ML; MG/100ML
INJECTION, SOLUTION INTRAVENOUS
Status: DISPENSED
Start: 2023-10-18

## (undated) RX ORDER — PROPOFOL 10 MG/ML
INJECTION, EMULSION INTRAVENOUS
Status: DISPENSED
Start: 2023-12-07

## (undated) RX ORDER — CEFAZOLIN SODIUM/WATER 2 G/20 ML
SYRINGE (ML) INTRAVENOUS
Status: DISPENSED
Start: 2023-12-07

## (undated) RX ORDER — BUPIVACAINE HYDROCHLORIDE AND EPINEPHRINE 2.5; 5 MG/ML; UG/ML
INJECTION, SOLUTION EPIDURAL; INFILTRATION; INTRACAUDAL; PERINEURAL
Status: DISPENSED
Start: 2023-12-07

## (undated) RX ORDER — ACETAMINOPHEN 325 MG/1
TABLET ORAL
Status: DISPENSED
Start: 2023-10-18

## (undated) RX ORDER — ENOXAPARIN SODIUM 100 MG/ML
INJECTION SUBCUTANEOUS
Status: DISPENSED
Start: 2023-10-18

## (undated) RX ORDER — PROPOFOL 10 MG/ML
INJECTION, EMULSION INTRAVENOUS
Status: DISPENSED
Start: 2023-10-18

## (undated) RX ORDER — HYDROMORPHONE HYDROCHLORIDE 1 MG/ML
INJECTION, SOLUTION INTRAMUSCULAR; INTRAVENOUS; SUBCUTANEOUS
Status: DISPENSED
Start: 2023-10-18

## (undated) RX ORDER — ONDANSETRON 2 MG/ML
INJECTION INTRAMUSCULAR; INTRAVENOUS
Status: DISPENSED
Start: 2023-12-07

## (undated) RX ORDER — FENTANYL CITRATE 50 UG/ML
INJECTION, SOLUTION INTRAMUSCULAR; INTRAVENOUS
Status: DISPENSED
Start: 2023-12-07

## (undated) RX ORDER — CEFAZOLIN SODIUM/WATER 2 G/20 ML
SYRINGE (ML) INTRAVENOUS
Status: DISPENSED
Start: 2023-10-18

## (undated) RX ORDER — DEXAMETHASONE SODIUM PHOSPHATE 4 MG/ML
INJECTION, SOLUTION INTRA-ARTICULAR; INTRALESIONAL; INTRAMUSCULAR; INTRAVENOUS; SOFT TISSUE
Status: DISPENSED
Start: 2023-12-07

## (undated) RX ORDER — HYDROMORPHONE HCL IN WATER/PF 6 MG/30 ML
PATIENT CONTROLLED ANALGESIA SYRINGE INTRAVENOUS
Status: DISPENSED
Start: 2023-12-07

## (undated) RX ORDER — EPHEDRINE SULFATE 50 MG/ML
INJECTION, SOLUTION INTRAMUSCULAR; INTRAVENOUS; SUBCUTANEOUS
Status: DISPENSED
Start: 2023-10-18

## (undated) RX ORDER — IPRATROPIUM BROMIDE AND ALBUTEROL SULFATE 2.5; .5 MG/3ML; MG/3ML
SOLUTION RESPIRATORY (INHALATION)
Status: DISPENSED
Start: 2023-10-18

## (undated) RX ORDER — FENTANYL CITRATE 50 UG/ML
INJECTION, SOLUTION INTRAMUSCULAR; INTRAVENOUS
Status: DISPENSED
Start: 2023-10-18

## (undated) RX ORDER — FENTANYL CITRATE-0.9 % NACL/PF 10 MCG/ML
PLASTIC BAG, INJECTION (ML) INTRAVENOUS
Status: DISPENSED
Start: 2023-12-07

## (undated) RX ORDER — OXYCODONE HYDROCHLORIDE 5 MG/1
TABLET ORAL
Status: DISPENSED
Start: 2023-12-07

## (undated) RX ORDER — DEXTROSE MONOHYDRATE, SODIUM CHLORIDE, AND POTASSIUM CHLORIDE 50; 1.49; 4.5 G/1000ML; G/1000ML; G/1000ML
INJECTION, SOLUTION INTRAVENOUS
Status: DISPENSED
Start: 2023-10-18